# Patient Record
Sex: FEMALE | Race: BLACK OR AFRICAN AMERICAN | NOT HISPANIC OR LATINO | Employment: FULL TIME | URBAN - NONMETROPOLITAN AREA
[De-identification: names, ages, dates, MRNs, and addresses within clinical notes are randomized per-mention and may not be internally consistent; named-entity substitution may affect disease eponyms.]

---

## 2021-05-13 ENCOUNTER — TELEPHONE (OUTPATIENT)
Dept: FAMILY MEDICINE | Facility: CLINIC | Age: 31
End: 2021-05-13

## 2021-05-13 ENCOUNTER — OFFICE VISIT (OUTPATIENT)
Dept: FAMILY MEDICINE | Facility: CLINIC | Age: 31
End: 2021-05-13
Payer: COMMERCIAL

## 2021-05-13 VITALS
WEIGHT: 221 LBS | RESPIRATION RATE: 18 BRPM | HEIGHT: 66 IN | HEART RATE: 95 BPM | BODY MASS INDEX: 35.52 KG/M2 | TEMPERATURE: 99 F | OXYGEN SATURATION: 99 % | SYSTOLIC BLOOD PRESSURE: 110 MMHG | DIASTOLIC BLOOD PRESSURE: 74 MMHG

## 2021-05-13 DIAGNOSIS — M54.9 DORSALGIA, UNSPECIFIED: ICD-10-CM

## 2021-05-13 DIAGNOSIS — Z13.220 SCREENING FOR LIPOID DISORDERS: Primary | ICD-10-CM

## 2021-05-13 DIAGNOSIS — R53.83 FATIGUE, UNSPECIFIED TYPE: ICD-10-CM

## 2021-05-13 DIAGNOSIS — E66.9 OBESITY (BMI 35.0-39.9 WITHOUT COMORBIDITY): ICD-10-CM

## 2021-05-13 DIAGNOSIS — Z13.1 SCREENING FOR DIABETES MELLITUS: ICD-10-CM

## 2021-05-13 PROCEDURE — 99395 PREV VISIT EST AGE 18-39: CPT | Mod: ,,, | Performed by: NURSE PRACTITIONER

## 2021-05-13 PROCEDURE — 3008F BODY MASS INDEX DOCD: CPT | Mod: ,,, | Performed by: NURSE PRACTITIONER

## 2021-05-13 PROCEDURE — 1126F AMNT PAIN NOTED NONE PRSNT: CPT | Mod: ,,, | Performed by: NURSE PRACTITIONER

## 2021-05-13 PROCEDURE — 1126F PR PAIN SEVERITY QUANTIFIED, NO PAIN PRESENT: ICD-10-PCS | Mod: ,,, | Performed by: NURSE PRACTITIONER

## 2021-05-13 PROCEDURE — 3008F PR BODY MASS INDEX (BMI) DOCUMENTED: ICD-10-PCS | Mod: ,,, | Performed by: NURSE PRACTITIONER

## 2021-05-13 PROCEDURE — 99395 PR PREVENTIVE VISIT,EST,18-39: ICD-10-PCS | Mod: ,,, | Performed by: NURSE PRACTITIONER

## 2021-05-18 DIAGNOSIS — R53.83 FATIGUE, UNSPECIFIED TYPE: Primary | ICD-10-CM

## 2021-05-18 DIAGNOSIS — Z13.220 SCREENING FOR LIPOID DISORDERS: ICD-10-CM

## 2021-05-18 DIAGNOSIS — Z13.1 SCREENING FOR DIABETES MELLITUS: ICD-10-CM

## 2021-11-16 ENCOUNTER — OFFICE VISIT (OUTPATIENT)
Dept: FAMILY MEDICINE | Facility: CLINIC | Age: 31
End: 2021-11-16
Payer: COMMERCIAL

## 2021-11-16 VITALS
OXYGEN SATURATION: 99 % | HEART RATE: 98 BPM | BODY MASS INDEX: 35.52 KG/M2 | DIASTOLIC BLOOD PRESSURE: 73 MMHG | RESPIRATION RATE: 18 BRPM | HEIGHT: 66 IN | SYSTOLIC BLOOD PRESSURE: 116 MMHG | TEMPERATURE: 98 F | WEIGHT: 221 LBS

## 2021-11-16 DIAGNOSIS — F98.8 ATTENTION DEFICIT DISORDER, UNSPECIFIED HYPERACTIVITY PRESENCE: ICD-10-CM

## 2021-11-16 DIAGNOSIS — M54.2 NECK PAIN: Primary | ICD-10-CM

## 2021-11-16 PROCEDURE — 1159F MED LIST DOCD IN RCRD: CPT | Mod: ,,, | Performed by: FAMILY MEDICINE

## 2021-11-16 PROCEDURE — 99213 OFFICE O/P EST LOW 20 MIN: CPT | Mod: ,,, | Performed by: FAMILY MEDICINE

## 2021-11-16 PROCEDURE — 1159F PR MEDICATION LIST DOCUMENTED IN MEDICAL RECORD: ICD-10-PCS | Mod: ,,, | Performed by: FAMILY MEDICINE

## 2021-11-16 PROCEDURE — 3008F BODY MASS INDEX DOCD: CPT | Mod: ,,, | Performed by: FAMILY MEDICINE

## 2021-11-16 PROCEDURE — 3078F PR MOST RECENT DIASTOLIC BLOOD PRESSURE < 80 MM HG: ICD-10-PCS | Mod: ,,, | Performed by: FAMILY MEDICINE

## 2021-11-16 PROCEDURE — 3078F DIAST BP <80 MM HG: CPT | Mod: ,,, | Performed by: FAMILY MEDICINE

## 2021-11-16 PROCEDURE — 3074F PR MOST RECENT SYSTOLIC BLOOD PRESSURE < 130 MM HG: ICD-10-PCS | Mod: ,,, | Performed by: FAMILY MEDICINE

## 2021-11-16 PROCEDURE — 99213 PR OFFICE/OUTPT VISIT, EST, LEVL III, 20-29 MIN: ICD-10-PCS | Mod: ,,, | Performed by: FAMILY MEDICINE

## 2021-11-16 PROCEDURE — 3074F SYST BP LT 130 MM HG: CPT | Mod: ,,, | Performed by: FAMILY MEDICINE

## 2021-11-16 PROCEDURE — 3008F PR BODY MASS INDEX (BMI) DOCUMENTED: ICD-10-PCS | Mod: ,,, | Performed by: FAMILY MEDICINE

## 2021-11-16 RX ORDER — ATOMOXETINE 25 MG/1
CAPSULE ORAL
Qty: 30 CAPSULE | Refills: 0 | Status: SHIPPED | OUTPATIENT
Start: 2021-11-16 | End: 2021-12-28 | Stop reason: DRUGHIGH

## 2021-11-16 RX ORDER — TIZANIDINE 4 MG/1
4 TABLET ORAL NIGHTLY PRN
Qty: 90 TABLET | Refills: 1 | Status: SHIPPED | OUTPATIENT
Start: 2021-11-16 | End: 2021-11-26

## 2021-11-17 ENCOUNTER — TELEPHONE (OUTPATIENT)
Dept: FAMILY MEDICINE | Facility: CLINIC | Age: 31
End: 2021-11-17
Payer: COMMERCIAL

## 2021-12-28 ENCOUNTER — OFFICE VISIT (OUTPATIENT)
Dept: FAMILY MEDICINE | Facility: CLINIC | Age: 31
End: 2021-12-28
Payer: COMMERCIAL

## 2021-12-28 VITALS
BODY MASS INDEX: 36.16 KG/M2 | OXYGEN SATURATION: 97 % | TEMPERATURE: 99 F | DIASTOLIC BLOOD PRESSURE: 90 MMHG | RESPIRATION RATE: 18 BRPM | SYSTOLIC BLOOD PRESSURE: 114 MMHG | WEIGHT: 225 LBS | HEART RATE: 76 BPM | HEIGHT: 66 IN

## 2021-12-28 DIAGNOSIS — Z00.00 ROUTINE GENERAL MEDICAL EXAMINATION AT A HEALTH CARE FACILITY: Primary | ICD-10-CM

## 2021-12-28 DIAGNOSIS — Z13.220 ENCOUNTER FOR SCREENING FOR LIPOID DISORDERS: ICD-10-CM

## 2021-12-28 DIAGNOSIS — Z12.4 SCREENING FOR CERVICAL CANCER: ICD-10-CM

## 2021-12-28 DIAGNOSIS — M25.50 ARTHRALGIA, UNSPECIFIED JOINT: ICD-10-CM

## 2021-12-28 DIAGNOSIS — Z13.1 SCREENING FOR DIABETES MELLITUS: ICD-10-CM

## 2021-12-28 DIAGNOSIS — F90.0 ATTENTION DEFICIT HYPERACTIVITY DISORDER (ADHD), PREDOMINANTLY INATTENTIVE TYPE: ICD-10-CM

## 2021-12-28 LAB
CHOLEST SERPL-MCNC: 129 MG/DL (ref 0–200)
CHOLEST/HDLC SERPL: 2.4 {RATIO}
GLUCOSE SERPL-MCNC: 63 MG/DL (ref 74–106)
HDLC SERPL-MCNC: 53 MG/DL (ref 40–60)
LDLC SERPL CALC-MCNC: 68 MG/DL
LDLC/HDLC SERPL: 1.3 {RATIO}
NONHDLC SERPL-MCNC: 76 MG/DL
TRIGL SERPL-MCNC: 42 MG/DL (ref 35–150)
VLDLC SERPL-MCNC: 8 MG/DL

## 2021-12-28 PROCEDURE — 1160F RVW MEDS BY RX/DR IN RCRD: CPT | Mod: ,,, | Performed by: FAMILY MEDICINE

## 2021-12-28 PROCEDURE — 1159F PR MEDICATION LIST DOCUMENTED IN MEDICAL RECORD: ICD-10-PCS | Mod: ,,, | Performed by: FAMILY MEDICINE

## 2021-12-28 PROCEDURE — 1159F MED LIST DOCD IN RCRD: CPT | Mod: ,,, | Performed by: FAMILY MEDICINE

## 2021-12-28 PROCEDURE — 1160F PR REVIEW ALL MEDS BY PRESCRIBER/CLIN PHARMACIST DOCUMENTED: ICD-10-PCS | Mod: ,,, | Performed by: FAMILY MEDICINE

## 2021-12-28 PROCEDURE — 3008F PR BODY MASS INDEX (BMI) DOCUMENTED: ICD-10-PCS | Mod: ,,, | Performed by: FAMILY MEDICINE

## 2021-12-28 PROCEDURE — 3074F SYST BP LT 130 MM HG: CPT | Mod: ,,, | Performed by: FAMILY MEDICINE

## 2021-12-28 PROCEDURE — 80061 LIPID PANEL: ICD-10-PCS | Mod: ,,, | Performed by: CLINICAL MEDICAL LABORATORY

## 2021-12-28 PROCEDURE — 3074F PR MOST RECENT SYSTOLIC BLOOD PRESSURE < 130 MM HG: ICD-10-PCS | Mod: ,,, | Performed by: FAMILY MEDICINE

## 2021-12-28 PROCEDURE — 3080F PR MOST RECENT DIASTOLIC BLOOD PRESSURE >= 90 MM HG: ICD-10-PCS | Mod: ,,, | Performed by: FAMILY MEDICINE

## 2021-12-28 PROCEDURE — 82947 GLUCOSE, FASTING: ICD-10-PCS | Mod: ,,, | Performed by: CLINICAL MEDICAL LABORATORY

## 2021-12-28 PROCEDURE — 80061 LIPID PANEL: CPT | Mod: ,,, | Performed by: CLINICAL MEDICAL LABORATORY

## 2021-12-28 PROCEDURE — 3008F BODY MASS INDEX DOCD: CPT | Mod: ,,, | Performed by: FAMILY MEDICINE

## 2021-12-28 PROCEDURE — 82947 ASSAY GLUCOSE BLOOD QUANT: CPT | Mod: ,,, | Performed by: CLINICAL MEDICAL LABORATORY

## 2021-12-28 PROCEDURE — 99395 PREV VISIT EST AGE 18-39: CPT | Mod: ,,, | Performed by: FAMILY MEDICINE

## 2021-12-28 PROCEDURE — 3080F DIAST BP >= 90 MM HG: CPT | Mod: ,,, | Performed by: FAMILY MEDICINE

## 2021-12-28 PROCEDURE — 99395 PR PREVENTIVE VISIT,EST,18-39: ICD-10-PCS | Mod: ,,, | Performed by: FAMILY MEDICINE

## 2021-12-28 RX ORDER — CYCLOBENZAPRINE HCL 10 MG
TABLET ORAL
COMMUNITY
Start: 2021-08-12 | End: 2023-01-12 | Stop reason: SDUPTHER

## 2021-12-28 RX ORDER — NAPROXEN 500 MG/1
TABLET ORAL
Qty: 60 TABLET | Refills: 2 | Status: SHIPPED | OUTPATIENT
Start: 2021-12-28 | End: 2023-01-12 | Stop reason: SDUPTHER

## 2021-12-28 RX ORDER — NAPROXEN 500 MG/1
TABLET ORAL
COMMUNITY
Start: 2021-08-12 | End: 2021-12-28 | Stop reason: SDUPTHER

## 2021-12-28 RX ORDER — ATOMOXETINE 60 MG/1
60 CAPSULE ORAL DAILY
Qty: 30 CAPSULE | Refills: 2 | Status: SHIPPED | OUTPATIENT
Start: 2021-12-28 | End: 2023-01-12

## 2021-12-29 ENCOUNTER — TELEPHONE (OUTPATIENT)
Dept: FAMILY MEDICINE | Facility: CLINIC | Age: 31
End: 2021-12-29
Payer: COMMERCIAL

## 2022-01-05 ENCOUNTER — TELEPHONE (OUTPATIENT)
Dept: FAMILY MEDICINE | Facility: CLINIC | Age: 32
End: 2022-01-05
Payer: COMMERCIAL

## 2022-01-05 NOTE — TELEPHONE ENCOUNTER
----- Message from Melany Rob sent at 12/30/2021  9:29 AM CST -----  Regarding: Lab Results  Contact: Patient  Pt needs: Lab Results Call Back    Pharmacy:    Phone #:  [phone]    Patient missed a call yesterday and wanted a call back about her results.

## 2022-09-20 ENCOUNTER — OFFICE VISIT (OUTPATIENT)
Dept: FAMILY MEDICINE | Facility: CLINIC | Age: 32
End: 2022-09-20
Payer: COMMERCIAL

## 2022-09-20 VITALS
HEIGHT: 66 IN | TEMPERATURE: 97 F | RESPIRATION RATE: 18 BRPM | DIASTOLIC BLOOD PRESSURE: 78 MMHG | OXYGEN SATURATION: 98 % | HEART RATE: 78 BPM | BODY MASS INDEX: 36.16 KG/M2 | SYSTOLIC BLOOD PRESSURE: 138 MMHG | WEIGHT: 225 LBS

## 2022-09-20 DIAGNOSIS — R00.2 PALPITATIONS: ICD-10-CM

## 2022-09-20 DIAGNOSIS — L73.2 HIDRADENITIS: Primary | ICD-10-CM

## 2022-09-20 LAB
EKG 12-LEAD: NORMAL
PR INTERVAL: NORMAL
PRT AXES: NORMAL
QRS DURATION: NORMAL
QT/QTC: NORMAL
VENTRICULAR RATE: NORMAL

## 2022-09-20 PROCEDURE — 99213 PR OFFICE/OUTPT VISIT, EST, LEVL III, 20-29 MIN: ICD-10-PCS | Mod: ,,, | Performed by: FAMILY MEDICINE

## 2022-09-20 PROCEDURE — 3078F DIAST BP <80 MM HG: CPT | Mod: ,,, | Performed by: FAMILY MEDICINE

## 2022-09-20 PROCEDURE — 3008F PR BODY MASS INDEX (BMI) DOCUMENTED: ICD-10-PCS | Mod: ,,, | Performed by: FAMILY MEDICINE

## 2022-09-20 PROCEDURE — 3008F BODY MASS INDEX DOCD: CPT | Mod: ,,, | Performed by: FAMILY MEDICINE

## 2022-09-20 PROCEDURE — 99213 OFFICE O/P EST LOW 20 MIN: CPT | Mod: ,,, | Performed by: FAMILY MEDICINE

## 2022-09-20 PROCEDURE — 1159F MED LIST DOCD IN RCRD: CPT | Mod: ,,, | Performed by: FAMILY MEDICINE

## 2022-09-20 PROCEDURE — 1160F PR REVIEW ALL MEDS BY PRESCRIBER/CLIN PHARMACIST DOCUMENTED: ICD-10-PCS | Mod: ,,, | Performed by: FAMILY MEDICINE

## 2022-09-20 PROCEDURE — 93005 POCT EKG 12-LEAD: ICD-10-PCS | Mod: ,,, | Performed by: FAMILY MEDICINE

## 2022-09-20 PROCEDURE — 1159F PR MEDICATION LIST DOCUMENTED IN MEDICAL RECORD: ICD-10-PCS | Mod: ,,, | Performed by: FAMILY MEDICINE

## 2022-09-20 PROCEDURE — 93005 ELECTROCARDIOGRAM TRACING: CPT | Mod: ,,, | Performed by: FAMILY MEDICINE

## 2022-09-20 PROCEDURE — 3075F PR MOST RECENT SYSTOLIC BLOOD PRESS GE 130-139MM HG: ICD-10-PCS | Mod: ,,, | Performed by: FAMILY MEDICINE

## 2022-09-20 PROCEDURE — 3078F PR MOST RECENT DIASTOLIC BLOOD PRESSURE < 80 MM HG: ICD-10-PCS | Mod: ,,, | Performed by: FAMILY MEDICINE

## 2022-09-20 PROCEDURE — 3075F SYST BP GE 130 - 139MM HG: CPT | Mod: ,,, | Performed by: FAMILY MEDICINE

## 2022-09-20 PROCEDURE — 1160F RVW MEDS BY RX/DR IN RCRD: CPT | Mod: ,,, | Performed by: FAMILY MEDICINE

## 2022-09-20 NOTE — PROGRESS NOTES
Tiffany Cornejo is a 31 y.o. female seen today for a recurrent papular eruption in her left axilla.  Patient reports often these with swelling drain and so far have not responded are improved when she has been on antibiotics.  We discussed a dermatology verses general surgery option.  For now, she would like something definitive done a possible.  She is also complaining of episodes of tachycardia that usually worsen with exertion.  Currently she is not having chest discomfort.    Past Medical History:   Diagnosis Date    ADHD (attention deficit hyperactivity disorder)      Family History   Problem Relation Age of Onset    Diabetes Mother     Hypertension Mother     Irritable bowel syndrome Mother     Cancer Maternal Grandfather     Hypertension Maternal Grandfather     Diabetes Maternal Grandfather      Current Outpatient Medications on File Prior to Visit   Medication Sig Dispense Refill    atomoxetine (STRATTERA) 60 MG capsule Take 1 capsule (60 mg total) by mouth once daily. 30 capsule 2    cyclobenzaprine (FLEXERIL) 10 MG tablet TAKE 1 TABLET BY MOUTH AT BEDTIME WITH FOOD CAUSES DROWSINESS-AVOID ALCOHOL!!      naproxen (NAPROSYN) 500 MG tablet TAKE 1 TABLET BY MOUTH TWICE A DAY WITH FOOD ...STOP TAKING ALL OTHER NSAIDS WHILE TAKING THIS MEDICATION 60 tablet 2     No current facility-administered medications on file prior to visit.       There is no immunization history on file for this patient.    Review of Systems   HENT:  Negative for hearing loss.    Eyes:  Negative for discharge.   Respiratory:  Negative for wheezing.    Cardiovascular:  Positive for palpitations. Negative for chest pain.   Gastrointestinal:  Negative for blood in stool, constipation, diarrhea and vomiting.   Genitourinary:  Negative for dysuria and hematuria.   Musculoskeletal:  Positive for neck pain.   Skin:  Positive for rash.   Neurological:  Negative for weakness and headaches.   Endo/Heme/Allergies:  Negative for polydipsia.       Vitals:    09/20/22 1350   BP: 138/78   Pulse: 78   Resp: 18   Temp: 97.4 °F (36.3 °C)       Physical Exam  Vitals reviewed.   Constitutional:       Appearance: Normal appearance.   HENT:      Head: Normocephalic.   Eyes:      Extraocular Movements: Extraocular movements intact.      Conjunctiva/sclera: Conjunctivae normal.      Pupils: Pupils are equal, round, and reactive to light.   Neck:      Thyroid: No thyroid mass or thyromegaly.   Cardiovascular:      Rate and Rhythm: Normal rate and regular rhythm.      Heart sounds: Normal heart sounds. No murmur heard.    No gallop.   Pulmonary:      Effort: Pulmonary effort is normal. No respiratory distress.      Breath sounds: Normal breath sounds. No wheezing or rales.   Skin:     General: Skin is warm and dry.      Coloration: Skin is not jaundiced or pale.      Findings: Rash present. Rash is nodular and papular.      Comments: Patient has subcutaneous nodular rash affecting her left axilla.   Neurological:      Mental Status: She is alert.   Psychiatric:         Mood and Affect: Mood normal.         Behavior: Behavior normal.         Thought Content: Thought content normal.         Judgment: Judgment normal.        Assessment and Plan  Hidradenitis  -     Ambulatory referral/consult to General Surgery; Future; Expected date: 09/27/2022    Palpitations  -     POCT EKG 12-LEAD (NOT FOR [facility] USE)  -     Comprehensive Metabolic Panel; Future; Expected date: 09/20/2022  -     Magnesium; Future; Expected date: 09/20/2022  -     TSH; Future; Expected date: 09/20/2022  -     Ambulatory referral/consult to Cardiology; Future; Expected date: 09/27/2022          Return to clinic in 2-3 weeks or as needed.  She will be seen by Cardiology tomorrow.    The patient has no Health Maintenance topics of status Not Due      Answers submitted by the patient for this visit:  Review of Systems Questionnaire (Submitted on 9/17/2022)  activity change: No  unexpected weight change:  No  rhinorrhea: No  trouble swallowing: No  visual disturbance: No  chest tightness: No  polyuria: No  difficulty urinating: No  menstrual problem: No  joint swelling: Yes  arthralgias: Yes  confusion: No  dysphoric mood: No

## 2022-09-21 DIAGNOSIS — R00.2 PALPITATIONS: Primary | ICD-10-CM

## 2022-09-26 ENCOUNTER — TELEPHONE (OUTPATIENT)
Dept: FAMILY MEDICINE | Facility: CLINIC | Age: 32
End: 2022-09-26
Payer: COMMERCIAL

## 2022-09-27 ENCOUNTER — OFFICE VISIT (OUTPATIENT)
Dept: SURGERY | Facility: CLINIC | Age: 32
End: 2022-09-27
Attending: SURGERY
Payer: COMMERCIAL

## 2022-09-27 DIAGNOSIS — L73.2 HIDRADENITIS: Primary | ICD-10-CM

## 2022-09-27 DIAGNOSIS — Z01.812 PRE-PROCEDURAL LABORATORY EXAMINATION: ICD-10-CM

## 2022-09-27 PROCEDURE — 99203 PR OFFICE/OUTPT VISIT, NEW, LEVL III, 30-44 MIN: ICD-10-PCS | Mod: S$PBB,,, | Performed by: SURGERY

## 2022-09-27 PROCEDURE — 99214 OFFICE O/P EST MOD 30 MIN: CPT | Mod: PBBFAC | Performed by: SURGERY

## 2022-09-27 PROCEDURE — 1159F MED LIST DOCD IN RCRD: CPT | Mod: ,,, | Performed by: SURGERY

## 2022-09-27 PROCEDURE — 99203 OFFICE O/P NEW LOW 30 MIN: CPT | Mod: S$PBB,,, | Performed by: SURGERY

## 2022-09-27 PROCEDURE — 1159F PR MEDICATION LIST DOCUMENTED IN MEDICAL RECORD: ICD-10-PCS | Mod: ,,, | Performed by: SURGERY

## 2022-09-27 NOTE — PATIENT INSTRUCTIONS
[facility]  [address]  [place] [address]      YOUR SURGERY DATE IS 10/17/22    LABWORK AND EKG IS SCHEDULED FOR 10/13/22. PLEASE SIGN IN ON 1ST FLOOR OF THE  [facility] FOR LAB.  EKG IS LOCATED ON 2ND FLOOR.      DO NOT EAT OR DRINK ANYTHING AFTER MIDNIGHT BEFORE YOU SURGERY    BRING ALL MEDICATIONS YOU ARE CURRENTLY TAKING WITH YOU.  IF YOU ARE TAKING  A BLOOD PRESSURE MEDICATION, TAKE YOUR BLOOD PRESSURE MEDICATION WITH A   SIP OF WATER WHEN YOU GET UP THE MORNING OF SURGERY.     YOU MUST PRE-ADMIT AT THE FOLLOWING WEBSITE:  WEBSITE: [email or web address]  PASSWORD: [email or web address]    A STAFF MEMBER FROM THE [facility] WILL CALL YOU THE DAY BEFORE  SURGERY TO LET YOU KNOW WHAT TIME TO ARRIVE THE MORNING OF SURGERY.  IF YOU HAVE NOT HEARD FROM THEM BY 4 P.M. THE DAY BEFORE YOUR SURGERY,   PLEASE CALL THEM [phone].      IF YOU HAVE ANY QUESTIONS YOU MAY CONTACT OUR OFFICE [phone].

## 2022-09-28 NOTE — PROGRESS NOTES
Subjective:       Patient ID: Tiffany Cornejo is a 31 y.o. female.    Chief Complaint: Hidradenitis Suppurativa (Left axilla)    30 y/o female with history of pain in bilateral axilla and intermittent drainage.  She has not had prior surgery.  She has been diagnosed with hidradenitis and is sent for surgical evaluation.      Hidradenitis Suppurativa    family history includes Cancer in her maternal grandfather; Diabetes in her maternal grandfather and mother; Hypertension in her maternal grandfather and mother; Irritable bowel syndrome in her mother.  Past Medical History:   Diagnosis Date    ADHD (attention deficit hyperactivity disorder)       Past Surgical History:   Procedure Laterality Date     SECTION      GALLBLADDER SURGERY      HIP SURGERY      KNEE SURGERY         reports that she has been smoking vaping with nicotine. She has never used smokeless tobacco. She reports current alcohol use. She reports that she does not use drugs.     Review of Systems   All other systems reviewed and are negative.       Objective:      There were no vitals taken for this visit.   Physical Exam  Constitutional:       General: She is awake.      Appearance: Normal appearance.   HENT:      Head: Atraumatic.   Cardiovascular:      Rate and Rhythm: Normal rate and regular rhythm.   Pulmonary:      Effort: Pulmonary effort is normal.   Chest:      Chest wall: No deformity.   Abdominal:      General: There is no distension.      Palpations: Abdomen is soft. There is no mass.      Tenderness: There is no abdominal tenderness.   Musculoskeletal:         General: No swelling.      Right lower leg: No edema.      Left lower leg: No edema.   Skin:     General: Skin is warm and dry.      Capillary Refill: Capillary refill takes less than 2 seconds.      Comments: Left axilla with three areas of swelling and mild tenderness in the subcuticular position.  No drainage.    Neurological:      General: No focal deficit present.       Mental Status: She is alert.   Psychiatric:         Mood and Affect: Mood normal.       Assessment/Plan:         Hidradenitis  -     CBC Auto Differential; Future; Expected date: 09/27/2022  -     Cancel: Ambulatory Referral to External Surgery  -     Ambulatory referral/consult to General Surgery  -     Cancel: SARS Antigen ([facility]); Future; Expected date: 10/13/2022  -     Ambulatory Referral to External Surgery    Pre-procedural laboratory examination  -     Cancel: SARS Antigen ([facility]); Future; Expected date: 10/13/2022         Problem List Items Addressed This Visit          Derm    Hidradenitis - Primary    Current Assessment & Plan     Plan excision left axilla hidradenitis  R/B include infection, poor healing, recurrence.  She understands, wishes to proceed.          Relevant Orders    CBC Auto Differential (Completed)    Ambulatory Referral to External Surgery     Other Visit Diagnoses       Pre-procedural laboratory examination

## 2022-09-28 NOTE — ASSESSMENT & PLAN NOTE
Plan excision left axilla hidradenitis  R/B include infection, poor healing, recurrence.  She understands, wishes to proceed.

## 2022-10-03 ENCOUNTER — OUTSIDE PLACE OF SERVICE (OUTPATIENT)
Dept: SURGERY | Facility: CLINIC | Age: 32
End: 2022-10-03
Payer: COMMERCIAL

## 2022-10-03 ENCOUNTER — LAB REQUISITION (OUTPATIENT)
Dept: LAB | Facility: HOSPITAL | Age: 32
End: 2022-10-03
Attending: SURGERY
Payer: COMMERCIAL

## 2022-10-03 DIAGNOSIS — L73.2 HIDRADENITIS SUPPURATIVA: ICD-10-CM

## 2022-10-03 PROCEDURE — 88304 TISSUE EXAM BY PATHOLOGIST: CPT | Mod: SUR | Performed by: SURGERY

## 2022-10-03 PROCEDURE — 11450 PR EXC SWEAT GLAND LESN AXILL,SIMPL: ICD-10-PCS | Mod: LT,,, | Performed by: SURGERY

## 2022-10-03 PROCEDURE — 88304 TISSUE EXAM BY PATHOLOGIST: CPT | Mod: 26,,, | Performed by: PATHOLOGY

## 2022-10-03 PROCEDURE — 11450 EXC SKN HDRDNT AX SMPL/NTRM: CPT | Mod: LT,,, | Performed by: SURGERY

## 2022-10-03 PROCEDURE — 88304 SURGICAL PATHOLOGY: ICD-10-PCS | Mod: 26,,, | Performed by: PATHOLOGY

## 2022-10-04 LAB
ESTROGEN SERPL-MCNC: NORMAL PG/ML
INSULIN SERPL-ACNC: NORMAL U[IU]/ML
LAB AP GROSS DESCRIPTION: NORMAL
LAB AP LABORATORY NOTES: NORMAL
T3RU NFR SERPL: NORMAL %

## 2022-10-14 ENCOUNTER — OFFICE VISIT (OUTPATIENT)
Dept: SURGERY | Facility: CLINIC | Age: 32
End: 2022-10-14
Attending: SURGERY
Payer: COMMERCIAL

## 2022-10-14 DIAGNOSIS — Z09 POSTOP CHECK: Primary | ICD-10-CM

## 2022-10-14 PROCEDURE — 99212 OFFICE O/P EST SF 10 MIN: CPT | Mod: S$PBB,,, | Performed by: NURSE PRACTITIONER

## 2022-10-14 PROCEDURE — 99212 OFFICE O/P EST SF 10 MIN: CPT | Mod: PBBFAC | Performed by: NURSE PRACTITIONER

## 2022-10-14 PROCEDURE — 1160F PR REVIEW ALL MEDS BY PRESCRIBER/CLIN PHARMACIST DOCUMENTED: ICD-10-PCS | Mod: ,,, | Performed by: NURSE PRACTITIONER

## 2022-10-14 PROCEDURE — 1159F MED LIST DOCD IN RCRD: CPT | Mod: ,,, | Performed by: NURSE PRACTITIONER

## 2022-10-14 PROCEDURE — 1160F RVW MEDS BY RX/DR IN RCRD: CPT | Mod: ,,, | Performed by: NURSE PRACTITIONER

## 2022-10-14 PROCEDURE — 99212 PR OFFICE/OUTPT VISIT, EST, LEVL II, 10-19 MIN: ICD-10-PCS | Mod: S$PBB,,, | Performed by: NURSE PRACTITIONER

## 2022-10-14 PROCEDURE — 1159F PR MEDICATION LIST DOCUMENTED IN MEDICAL RECORD: ICD-10-PCS | Mod: ,,, | Performed by: NURSE PRACTITIONER

## 2022-10-14 NOTE — PROGRESS NOTES
Tiffany Cornejo is a 31 y.o. female patient.   No diagnosis found.  Past Medical History:   Diagnosis Date    ADHD (attention deficit hyperactivity disorder)      No past surgical history pertinent negatives on file.  Scheduled Meds:  Continuous Infusions:  PRN Meds:    Review of patient's allergies indicates:  No Known Allergies  There are no hospital problems to display for this patient.    There were no vitals taken for this visit.    Subjective anxious re: having sutures removed  Objective  left axilla sutures well approximated no signs of infection   Assessment & Plan     A. Skin and subcutaneous tissue of the left axilla, excisional debridement:  Chronic inflammatory changes and abscess associated with hidradenitis     B. Additional skin and subcutaneous tissue of the left axilla, excisional debridement:  Chronic inflammatory changes associated with disrupted epidermal inclusion cyst and hidradenitis        2 week follow up excision hidradenitis    Remove sutures  Educated on path  Educated keep clean/dry  Return to work 1 week if no problems    BRUNO Cotton  10/14/2022

## 2022-10-14 NOTE — LETTER
October 14, 2022      [facility] - General Surgery  [address]  [place] [address]  Phone: [phone]  Fax: [phone]       Patient: Tiffany Cornejo   YOB: 1990  Date of Visit: 10/14/2022    To Whom It May Concern:    Delmer Cornejo  was at [facility] on 10/14/2022. The patient may return to work on 10/24/22 with no restrictions. If you have any questions or concerns, or if I can be of further assistance, please do not hesitate to contact me.    Sincerely,    Darío Chiu M.D.

## 2022-12-30 ENCOUNTER — HOSPITAL ENCOUNTER (EMERGENCY)
Facility: HOSPITAL | Age: 32
Discharge: HOME | End: 2022-12-30
Attending: EMERGENCY MEDICINE
Payer: OTHER MISCELLANEOUS

## 2022-12-30 VITALS
HEIGHT: 66 IN | BODY MASS INDEX: 36.16 KG/M2 | WEIGHT: 225 LBS | SYSTOLIC BLOOD PRESSURE: 128 MMHG | RESPIRATION RATE: 16 BRPM | DIASTOLIC BLOOD PRESSURE: 78 MMHG | OXYGEN SATURATION: 88 % | HEART RATE: 89 BPM | TEMPERATURE: 98 F

## 2022-12-30 DIAGNOSIS — S89.91XA KNEE INJURY, RIGHT, INITIAL ENCOUNTER: ICD-10-CM

## 2022-12-30 DIAGNOSIS — T14.90XA INJURY: ICD-10-CM

## 2022-12-30 DIAGNOSIS — S80.01XA CONTUSION OF RIGHT KNEE, INITIAL ENCOUNTER: Primary | ICD-10-CM

## 2022-12-30 PROCEDURE — 99283 EMERGENCY DEPT VISIT LOW MDM: CPT | Mod: ,,, | Performed by: EMERGENCY MEDICINE

## 2022-12-30 PROCEDURE — 99283 PR EMERGENCY DEPT VISIT,LEVEL III: ICD-10-PCS | Mod: ,,, | Performed by: EMERGENCY MEDICINE

## 2022-12-30 PROCEDURE — 99284 EMERGENCY DEPT VISIT MOD MDM: CPT

## 2022-12-30 PROCEDURE — 63600175 PHARM REV CODE 636 W HCPCS: Performed by: EMERGENCY MEDICINE

## 2022-12-30 PROCEDURE — 25000003 PHARM REV CODE 250: Performed by: EMERGENCY MEDICINE

## 2022-12-30 PROCEDURE — 96372 THER/PROPH/DIAG INJ SC/IM: CPT | Performed by: EMERGENCY MEDICINE

## 2022-12-30 RX ORDER — KETOROLAC TROMETHAMINE 30 MG/ML
60 INJECTION, SOLUTION INTRAMUSCULAR; INTRAVENOUS
Status: COMPLETED | OUTPATIENT
Start: 2022-12-30 | End: 2022-12-30

## 2022-12-30 RX ORDER — METHOCARBAMOL 500 MG/1
TABLET, FILM COATED ORAL 3 TIMES DAILY
Qty: 30 TABLET | Refills: 0 | Status: SHIPPED | OUTPATIENT
Start: 2022-12-30 | End: 2022-12-30 | Stop reason: SDUPTHER

## 2022-12-30 RX ORDER — ONDANSETRON 4 MG/1
4 TABLET, ORALLY DISINTEGRATING ORAL
Status: COMPLETED | OUTPATIENT
Start: 2022-12-30 | End: 2022-12-30

## 2022-12-30 RX ORDER — METHOCARBAMOL 500 MG/1
TABLET, FILM COATED ORAL 3 TIMES DAILY
Qty: 30 TABLET | Refills: 0 | Status: SHIPPED | OUTPATIENT
Start: 2022-12-30 | End: 2023-01-04

## 2022-12-30 RX ORDER — ORPHENADRINE CITRATE 30 MG/ML
60 INJECTION INTRAMUSCULAR; INTRAVENOUS
Status: COMPLETED | OUTPATIENT
Start: 2022-12-30 | End: 2022-12-30

## 2022-12-30 RX ADMIN — KETOROLAC TROMETHAMINE 60 MG: 30 INJECTION, SOLUTION INTRAMUSCULAR at 07:12

## 2022-12-30 RX ADMIN — ONDANSETRON 4 MG: 4 TABLET, ORALLY DISINTEGRATING ORAL at 07:12

## 2022-12-30 RX ADMIN — ORPHENADRINE CITRATE 60 MG: 30 INJECTION INTRAMUSCULAR; INTRAVENOUS at 07:12

## 2022-12-30 NOTE — Clinical Note
"Tiffany Narayan" Clemente was seen and treated in our emergency department on 12/30/2022.  She may return to work after being cleared by follow-up physician 01/06/2023.       If you have any questions or concerns, please don't hesitate to call.       RN"

## 2022-12-31 NOTE — ED PROVIDER NOTES
Encounter Date: 2022    SCRIBE #1 NOTE: I, Osiris Farr, am scribing for, and in the presence of,  Serafin Nunez MD. I have scribed the entire note.     History     Chief Complaint   Patient presents with    Knee Injury     Right knee- ambulance stretcher came down on top of knee and patient felt knee cap displace. Patient unsure if knee cap is back in place. Patient had episode of vomiting after incident     32 y.o. female presents to the ED with complaints of right knee pain. Patient was here unloading a patient when the stretcher bounced off her right knee.     The history is provided by the patient. No  was used.   Review of patient's allergies indicates:  No Known Allergies  Past Medical History:   Diagnosis Date    ADHD (attention deficit hyperactivity disorder)      Past Surgical History:   Procedure Laterality Date     SECTION      EXCISION OF HIDRADENITIS      GALLBLADDER SURGERY      HIP SURGERY      KNEE SURGERY       Family History   Problem Relation Age of Onset    Diabetes Mother     Hypertension Mother     Irritable bowel syndrome Mother     Cancer Maternal Grandfather     Hypertension Maternal Grandfather     Diabetes Maternal Grandfather      Social History     Tobacco Use    Smoking status: Every Day     Types: Vaping with nicotine    Smokeless tobacco: Never   Substance Use Topics    Alcohol use: Yes     Comment: Socially    Drug use: Never     Review of Systems   Constitutional: Negative.    HENT: Negative.     Eyes: Negative.    Respiratory: Negative.     Cardiovascular: Negative.    Gastrointestinal: Negative.    Endocrine: Negative.    Genitourinary: Negative.    Musculoskeletal:         Right knee pain.    Skin: Negative.    Allergic/Immunologic: Negative.    Neurological: Negative.    Hematological: Negative.    Psychiatric/Behavioral: Negative.     All other systems reviewed and are negative.    Physical Exam     Initial Vitals [22 1838]    BP Pulse Resp Temp SpO2   (!) 140/82 107 18 98.1 °F (36.7 °C) 99 %      MAP       --         Physical Exam    Vitals reviewed.  Constitutional: She appears well-developed and well-nourished. No distress.   HENT:   Head: Normocephalic.   Eyes: Conjunctivae are normal. Pupils are equal, round, and reactive to light.   Cardiovascular:  Normal rate, regular rhythm, normal heart sounds and intact distal pulses.           Pulmonary/Chest: Breath sounds normal.   Abdominal: Abdomen is soft. Bowel sounds are normal.   Musculoskeletal:      Comments: Tenderness over right patella.      Neurological: She is alert and oriented to person, place, and time.   Skin: Skin is warm and dry.   Psychiatric: She has a normal mood and affect.       ED Course   Procedures  Labs Reviewed - No data to display       Imaging Results              X-Ray Knee AP LAT with [place] Right (Final result)  Result time 12/30/22 20:36:47   Procedure changed from X-Ray Knee 3 View Left     Final result by Serafin Rayo MD (12/30/22 20:36:47)                   Impression:      No acute radiographic abnormality.    Place of service: [facility]      Electronically signed by: Serafin Rayo  Date:    12/30/2022  Time:    20:36               Narrative:    EXAMINATION:  XR in knee three views right    CLINICAL HISTORY:  Knee pain, injury    COMPARISON:  Prior left knee radiograph 09/15/2019    FINDINGS:  There is no acute osseous, articular or soft tissue abnormality identified.  No suspected joint effusion.  No radiopaque foreign bodies are identified in the soft tissues.    The right knee joint space appears relatively preserved.                                       Medications   ketorolac injection 60 mg (60 mg Intramuscular Given 12/30/22 1921)   ondansetron disintegrating tablet 4 mg (4 mg Oral Given 12/30/22 1921)   orphenadrine injection 60 mg (60 mg Intramuscular Given 12/30/22 1932)                Attending Attestation:            Physician Attestation for Scribe:  Physician Attestation Statement for Scribe #1: I, Serafin Nunez MD, reviewed documentation, as scribed by Osiris Farr in my presence, and it is both accurate and complete.                        Clinical Impression:   Final diagnoses:  [S89.91XA] Knee injury, right, initial encounter  [T14.90XA] Injury  [S80.01XA] Contusion of right knee, initial encounter (Primary)        ED Disposition Condition    Discharge Stable          ED Prescriptions       Medication Sig Dispense Start Date End Date Auth. Provider    methocarbamoL (ROBAXIN) 500 MG Tab  (Status: Discontinued) Take 2-3 tablets (1,000-1,500 mg total) by mouth 3 (three) times daily. for 5 days 30 tablet 12/30/2022 12/30/2022 Serafin Nunez MD    methocarbamoL (ROBAXIN) 500 MG Tab Take 2-3 tablets (1,000-1,500 mg total) by mouth 3 (three) times daily. for 5 days 30 tablet 12/30/2022 1/4/2023 Serafin Nunez MD          Follow-up Information       Follow up With Specialties Details Why Contact Info    Rudy Roa III, MD Orthopedic Surgery Schedule an appointment as soon as possible for a visit  As needed if you can not get in with your regular orthopedist [address] [place] [address]  [phone]      Celestine Mccollum MD Orthopedic Surgery Schedule an appointment as soon as possible for a visit   [address] [address]  [phone]      [facility] Department Emergency Medicine Go to  As needed [address] [id number] [phone]             Serafin Nunez MD  12/30/22 2136

## 2022-12-31 NOTE — DISCHARGE INSTRUCTIONS
Use immobilizer and crutches as needed.  Follow-up with your orthopedist.  Use ibuprofen and Tylenol as needed.  Use prescription muscle relaxer as needed.  Use ice.  After the initial injury resolves pursue physical therapy.

## 2023-01-03 ENCOUNTER — LAB VISIT (OUTPATIENT)
Dept: PRIMARY CARE CLINIC | Facility: CLINIC | Age: 33
End: 2023-01-03
Payer: COMMERCIAL

## 2023-01-03 DIAGNOSIS — Z02.83 ENCOUNTER FOR DRUG SCREENING: Primary | ICD-10-CM

## 2023-01-03 PROCEDURE — 99000 SPECIMEN HANDLING OFFICE-LAB: CPT | Mod: ,,, | Performed by: NURSE PRACTITIONER

## 2023-01-03 PROCEDURE — 99000 PR SPECIMEN HANDLING,DR OFF->LAB: ICD-10-PCS | Mod: ,,, | Performed by: NURSE PRACTITIONER

## 2023-01-03 NOTE — PROGRESS NOTES
Subjective:       Patient ID: Tiffany Cornejo is a 32 y.o. female.    Chief Complaint: No chief complaint on file.    HPI  Review of Systems      Objective:      Physical Exam    Assessment:       Problem List Items Addressed This Visit    None  Visit Diagnoses       Encounter for drug screening    -  Primary            Plan:       Drug testing only

## 2023-01-12 ENCOUNTER — OFFICE VISIT (OUTPATIENT)
Dept: FAMILY MEDICINE | Facility: CLINIC | Age: 33
End: 2023-01-12
Payer: COMMERCIAL

## 2023-01-12 VITALS
HEART RATE: 88 BPM | WEIGHT: 225 LBS | OXYGEN SATURATION: 98 % | RESPIRATION RATE: 18 BRPM | DIASTOLIC BLOOD PRESSURE: 60 MMHG | BODY MASS INDEX: 36.16 KG/M2 | HEIGHT: 66 IN | SYSTOLIC BLOOD PRESSURE: 104 MMHG

## 2023-01-12 DIAGNOSIS — Z00.00 ROUTINE GENERAL MEDICAL EXAMINATION AT A HEALTH CARE FACILITY: Primary | ICD-10-CM

## 2023-01-12 DIAGNOSIS — G89.29 CHRONIC MIDLINE LOW BACK PAIN WITHOUT SCIATICA: ICD-10-CM

## 2023-01-12 DIAGNOSIS — Z13.1 SCREENING FOR DIABETES MELLITUS: ICD-10-CM

## 2023-01-12 DIAGNOSIS — M54.50 CHRONIC MIDLINE LOW BACK PAIN WITHOUT SCIATICA: ICD-10-CM

## 2023-01-12 DIAGNOSIS — Z13.220 SCREENING FOR LIPOID DISORDERS: ICD-10-CM

## 2023-01-12 DIAGNOSIS — M25.50 ARTHRALGIA, UNSPECIFIED JOINT: ICD-10-CM

## 2023-01-12 LAB
CHOLEST SERPL-MCNC: 165 MG/DL (ref 0–200)
CHOLEST/HDLC SERPL: 2.8 {RATIO}
GLUCOSE SERPL-MCNC: 83 MG/DL (ref 74–106)
HDLC SERPL-MCNC: 60 MG/DL (ref 40–60)
LDLC SERPL CALC-MCNC: 94 MG/DL
LDLC/HDLC SERPL: 1.6 {RATIO}
NONHDLC SERPL-MCNC: 105 MG/DL
TRIGL SERPL-MCNC: 55 MG/DL (ref 35–150)
VLDLC SERPL-MCNC: 11 MG/DL

## 2023-01-12 PROCEDURE — 99395 PR PREVENTIVE VISIT,EST,18-39: ICD-10-PCS | Mod: ,,, | Performed by: FAMILY MEDICINE

## 2023-01-12 PROCEDURE — 3078F PR MOST RECENT DIASTOLIC BLOOD PRESSURE < 80 MM HG: ICD-10-PCS | Mod: ,,, | Performed by: FAMILY MEDICINE

## 2023-01-12 PROCEDURE — 99395 PREV VISIT EST AGE 18-39: CPT | Mod: ,,, | Performed by: FAMILY MEDICINE

## 2023-01-12 PROCEDURE — 80061 LIPID PANEL: ICD-10-PCS | Mod: ,,, | Performed by: CLINICAL MEDICAL LABORATORY

## 2023-01-12 PROCEDURE — 82947 ASSAY GLUCOSE BLOOD QUANT: CPT | Mod: ,,, | Performed by: CLINICAL MEDICAL LABORATORY

## 2023-01-12 PROCEDURE — 82947 GLUCOSE, FASTING: ICD-10-PCS | Mod: ,,, | Performed by: CLINICAL MEDICAL LABORATORY

## 2023-01-12 PROCEDURE — 3074F PR MOST RECENT SYSTOLIC BLOOD PRESSURE < 130 MM HG: ICD-10-PCS | Mod: ,,, | Performed by: FAMILY MEDICINE

## 2023-01-12 PROCEDURE — 36415 PR COLLECTION VENOUS BLOOD,VENIPUNCTURE: ICD-10-PCS | Mod: ,,, | Performed by: CLINICAL MEDICAL LABORATORY

## 2023-01-12 PROCEDURE — 80061 LIPID PANEL: CPT | Mod: ,,, | Performed by: CLINICAL MEDICAL LABORATORY

## 2023-01-12 PROCEDURE — 3008F PR BODY MASS INDEX (BMI) DOCUMENTED: ICD-10-PCS | Mod: ,,, | Performed by: FAMILY MEDICINE

## 2023-01-12 PROCEDURE — 3078F DIAST BP <80 MM HG: CPT | Mod: ,,, | Performed by: FAMILY MEDICINE

## 2023-01-12 PROCEDURE — 1160F RVW MEDS BY RX/DR IN RCRD: CPT | Mod: ,,, | Performed by: FAMILY MEDICINE

## 2023-01-12 PROCEDURE — 1159F PR MEDICATION LIST DOCUMENTED IN MEDICAL RECORD: ICD-10-PCS | Mod: ,,, | Performed by: FAMILY MEDICINE

## 2023-01-12 PROCEDURE — 1159F MED LIST DOCD IN RCRD: CPT | Mod: ,,, | Performed by: FAMILY MEDICINE

## 2023-01-12 PROCEDURE — 36415 COLL VENOUS BLD VENIPUNCTURE: CPT | Mod: ,,, | Performed by: CLINICAL MEDICAL LABORATORY

## 2023-01-12 PROCEDURE — 3074F SYST BP LT 130 MM HG: CPT | Mod: ,,, | Performed by: FAMILY MEDICINE

## 2023-01-12 PROCEDURE — 3008F BODY MASS INDEX DOCD: CPT | Mod: ,,, | Performed by: FAMILY MEDICINE

## 2023-01-12 PROCEDURE — 1160F PR REVIEW ALL MEDS BY PRESCRIBER/CLIN PHARMACIST DOCUMENTED: ICD-10-PCS | Mod: ,,, | Performed by: FAMILY MEDICINE

## 2023-01-12 RX ORDER — NAPROXEN 500 MG/1
TABLET ORAL
Qty: 60 TABLET | Refills: 2 | Status: SHIPPED | OUTPATIENT
Start: 2023-01-12 | End: 2023-11-30 | Stop reason: SDUPTHER

## 2023-01-12 RX ORDER — CYCLOBENZAPRINE HCL 10 MG
TABLET ORAL
Qty: 30 TABLET | Refills: 2 | Status: SHIPPED | OUTPATIENT
Start: 2023-01-12 | End: 2023-11-30 | Stop reason: SDUPTHER

## 2023-01-12 NOTE — PROGRESS NOTES
Tiffany Cornejo is a 32 y.o. female seen today for her healthy you.  She denies any chest pain shortness a breath but discontinued complain of low back pain and diffuse joint pain.  Patient also recently injured her right knee and is currently in a splint.  At this time, patient is at physical therapy for her knee and request an evaluation for her low back pain and diffuse joint pain is well.      Past Medical History:   Diagnosis Date    ADHD (attention deficit hyperactivity disorder)      Family History   Problem Relation Age of Onset    Diabetes Mother     Hypertension Mother     Irritable bowel syndrome Mother     Cancer Maternal Grandfather     Hypertension Maternal Grandfather     Diabetes Maternal Grandfather      Current Outpatient Medications on File Prior to Visit   Medication Sig Dispense Refill    [DISCONTINUED] cyclobenzaprine (FLEXERIL) 10 MG tablet TAKE 1 TABLET BY MOUTH AT BEDTIME WITH FOOD CAUSES DROWSINESS-AVOID ALCOHOL!!      [DISCONTINUED] naproxen (NAPROSYN) 500 MG tablet TAKE 1 TABLET BY MOUTH TWICE A DAY WITH FOOD ...STOP TAKING ALL OTHER NSAIDS WHILE TAKING THIS MEDICATION 60 tablet 2    [DISCONTINUED] atomoxetine (STRATTERA) 60 MG capsule Take 1 capsule (60 mg total) by mouth once daily. 30 capsule 2     No current facility-administered medications on file prior to visit.       There is no immunization history on file for this patient.    Review of Systems   Constitutional:  Negative for fever, malaise/fatigue and weight loss.   Respiratory:  Negative for shortness of breath.    Cardiovascular:  Negative for chest pain and palpitations.   Gastrointestinal:  Negative for nausea and vomiting.   Musculoskeletal:  Positive for back pain, joint pain and myalgias. Negative for falls.   Psychiatric/Behavioral:  Negative for depression.       Vitals:    01/12/23 0846   BP: 104/60   Pulse: 88   Resp: 18       Physical Exam  Vitals reviewed.   Constitutional:       Appearance: Normal appearance.   HENT:       Head: Normocephalic.   Eyes:      Extraocular Movements: Extraocular movements intact.      Conjunctiva/sclera: Conjunctivae normal.      Pupils: Pupils are equal, round, and reactive to light.   Neck:      Thyroid: No thyroid mass or thyromegaly.   Cardiovascular:      Rate and Rhythm: Normal rate and regular rhythm.      Heart sounds: Normal heart sounds. No murmur heard.    No gallop.   Pulmonary:      Effort: Pulmonary effort is normal. No respiratory distress.      Breath sounds: Normal breath sounds. No wheezing or rales.   Musculoskeletal:      Lumbar back: Spasms and tenderness present. Decreased range of motion.      Right knee: Decreased range of motion.      Comments: She is slow to rise from a seated position with an antalgic gait.   Skin:     General: Skin is warm and dry.      Coloration: Skin is not jaundiced or pale.   Neurological:      Mental Status: She is alert.   Psychiatric:         Mood and Affect: Mood normal.         Behavior: Behavior normal.         Thought Content: Thought content normal.         Judgment: Judgment normal.        Assessment and Plan  Screening for diabetes mellitus  -     Glucose, Fasting; Future; Expected date: 01/12/2023    Arthralgia, unspecified joint  -     naproxen (NAPROSYN) 500 MG tablet; TAKE 1 TABLET BY MOUTH TWICE A DAY WITH FOOD ...STOP TAKING ALL OTHER NSAIDS WHILE TAKING THIS MEDICATION  Dispense: 60 tablet; Refill: 2  -     Ambulatory referral/consult to Physical/Occupational Therapy; Future; Expected date: 01/19/2023    Screening for lipoid disorders  -     Lipid Panel; Future; Expected date: 01/12/2023    Chronic midline low back pain without sciatica  -     Ambulatory referral/consult to Physical/Occupational Therapy; Future; Expected date: 01/19/2023    Other orders  -     cyclobenzaprine (FLEXERIL) 10 MG tablet; TAKE 1 TABLET BY MOUTH AT BEDTIME WITH FOOD CAUSES DROWSINESS-AVOID ALCOHOL!!  Dispense: 30 tablet; Refill: 2            Return to clinic  in 6 weeks for follow-up.  May consider a weight loss medication.    The patient has no Health Maintenance topics of status Not Due

## 2023-01-13 ENCOUNTER — TELEPHONE (OUTPATIENT)
Dept: FAMILY MEDICINE | Facility: CLINIC | Age: 33
End: 2023-01-13
Payer: COMMERCIAL

## 2023-01-13 NOTE — TELEPHONE ENCOUNTER
----- Message from Kiran Malin MD sent at 1/13/2023  7:59 AM CST -----  Glucose and lipids were good.

## 2023-06-26 ENCOUNTER — PATIENT OUTREACH (OUTPATIENT)
Dept: ADMINISTRATIVE | Facility: HOSPITAL | Age: 33
End: 2023-06-26

## 2023-06-26 NOTE — PROGRESS NOTES
Health maintenance record review for population health care gaps    Chart review for cervical cancer screen    [facility] and Epic no cervical cancer screen report found      Patient next scheduled appt is 1/22/24 for a Healthy You Visit    Health Maintenance Due   Topic Date Due    Hepatitis C Screening  Never done    Cervical Cancer Screening  Never done    COVID-19 Vaccine (1) Never done    Pneumococcal Vaccines (Age 0-64) (1 - PCV) Never done    HIV Screening  Never done    TETANUS VACCINE  Never done

## 2023-07-13 RX ORDER — CIPROFLOXACIN AND DEXAMETHASONE 3; 1 MG/ML; MG/ML
4 SUSPENSION/ DROPS AURICULAR (OTIC) 2 TIMES DAILY
Qty: 7.5 ML | Refills: 0 | Status: SHIPPED | OUTPATIENT
Start: 2023-07-13 | End: 2023-07-20

## 2023-07-19 RX ORDER — AMOXICILLIN AND CLAVULANATE POTASSIUM 875; 125 MG/1; MG/1
1 TABLET, FILM COATED ORAL 2 TIMES DAILY
Qty: 14 TABLET | Refills: 0 | Status: SHIPPED | OUTPATIENT
Start: 2023-07-19

## 2023-07-20 ENCOUNTER — PATIENT MESSAGE (OUTPATIENT)
Dept: ADMINISTRATIVE | Facility: HOSPITAL | Age: 33
End: 2023-07-20

## 2023-11-30 ENCOUNTER — OFFICE VISIT (OUTPATIENT)
Dept: FAMILY MEDICINE | Facility: CLINIC | Age: 33
End: 2023-11-30
Payer: COMMERCIAL

## 2023-11-30 VITALS
RESPIRATION RATE: 18 BRPM | HEIGHT: 66 IN | HEART RATE: 89 BPM | SYSTOLIC BLOOD PRESSURE: 126 MMHG | TEMPERATURE: 98 F | BODY MASS INDEX: 36.32 KG/M2 | DIASTOLIC BLOOD PRESSURE: 88 MMHG | OXYGEN SATURATION: 98 %

## 2023-11-30 DIAGNOSIS — L73.2 HYDRADENITIS: ICD-10-CM

## 2023-11-30 DIAGNOSIS — R21 DISCOID RASH: Primary | ICD-10-CM

## 2023-11-30 DIAGNOSIS — M25.50 ARTHRALGIA, UNSPECIFIED JOINT: ICD-10-CM

## 2023-11-30 LAB
BASOPHILS # BLD AUTO: 0.06 K/UL (ref 0–0.2)
BASOPHILS NFR BLD AUTO: 0.5 % (ref 0–1)
CRP SERPL-MCNC: <0.29 MG/DL (ref 0–0.8)
DIFFERENTIAL METHOD BLD: ABNORMAL
EOSINOPHIL # BLD AUTO: 0.2 K/UL (ref 0–0.5)
EOSINOPHIL NFR BLD AUTO: 1.7 % (ref 1–4)
ERYTHROCYTE [DISTWIDTH] IN BLOOD BY AUTOMATED COUNT: 13.1 % (ref 11.5–14.5)
ERYTHROCYTE [SEDIMENTATION RATE] IN BLOOD BY WESTERGREN METHOD: 15 MM/HR (ref 0–20)
HCT VFR BLD AUTO: 42.9 % (ref 38–47)
HGB BLD-MCNC: 13.6 G/DL (ref 12–16)
IMM GRANULOCYTES # BLD AUTO: 0.03 K/UL (ref 0–0.04)
IMM GRANULOCYTES NFR BLD: 0.3 % (ref 0–0.4)
LYMPHOCYTES # BLD AUTO: 3.79 K/UL (ref 1–4.8)
LYMPHOCYTES NFR BLD AUTO: 33.1 % (ref 27–41)
MCH RBC QN AUTO: 28.3 PG (ref 27–31)
MCHC RBC AUTO-ENTMCNC: 31.7 G/DL (ref 32–36)
MCV RBC AUTO: 89.2 FL (ref 80–96)
MONOCYTES # BLD AUTO: 1.04 K/UL (ref 0–0.8)
MONOCYTES NFR BLD AUTO: 9.1 % (ref 2–6)
MPC BLD CALC-MCNC: 11.3 FL (ref 9.4–12.4)
NEUTROPHILS # BLD AUTO: 6.34 K/UL (ref 1.8–7.7)
NEUTROPHILS NFR BLD AUTO: 55.3 % (ref 53–65)
NRBC # BLD AUTO: 0 X10E3/UL
NRBC, AUTO (.00): 0 %
PLATELET # BLD AUTO: 287 K/UL (ref 150–400)
RBC # BLD AUTO: 4.81 M/UL (ref 4.2–5.4)
RHEUMATOID FACT SER NEPH-ACNC: NEGATIVE [IU]/ML
SYPHILIS AB INTERPRETATION: NORMAL
WBC # BLD AUTO: 11.46 K/UL (ref 4.5–11)

## 2023-11-30 PROCEDURE — 86780 TREPONEMA PALLIDUM (SYPHILIS) ANTIBODY: ICD-10-PCS | Mod: ,,, | Performed by: CLINICAL MEDICAL LABORATORY

## 2023-11-30 PROCEDURE — 86140 C-REACTIVE PROTEIN: ICD-10-PCS | Mod: ,,, | Performed by: CLINICAL MEDICAL LABORATORY

## 2023-11-30 PROCEDURE — 3008F PR BODY MASS INDEX (BMI) DOCUMENTED: ICD-10-PCS | Mod: ,,, | Performed by: FAMILY MEDICINE

## 2023-11-30 PROCEDURE — 3079F DIAST BP 80-89 MM HG: CPT | Mod: ,,, | Performed by: FAMILY MEDICINE

## 2023-11-30 PROCEDURE — 86140 C-REACTIVE PROTEIN: CPT | Mod: ,,, | Performed by: CLINICAL MEDICAL LABORATORY

## 2023-11-30 PROCEDURE — 86780 TREPONEMA PALLIDUM: CPT | Mod: ,,, | Performed by: CLINICAL MEDICAL LABORATORY

## 2023-11-30 PROCEDURE — 99214 PR OFFICE/OUTPT VISIT, EST, LEVL IV, 30-39 MIN: ICD-10-PCS | Mod: ,,, | Performed by: FAMILY MEDICINE

## 2023-11-30 PROCEDURE — 85651 SEDIMENTATION RATE, AUTOMATED: ICD-10-PCS | Mod: ,,, | Performed by: CLINICAL MEDICAL LABORATORY

## 2023-11-30 PROCEDURE — 1160F RVW MEDS BY RX/DR IN RCRD: CPT | Mod: ,,, | Performed by: FAMILY MEDICINE

## 2023-11-30 PROCEDURE — 86038 ANA EIA W/REFLEX DSDNA/ENA: ICD-10-PCS | Mod: ,,, | Performed by: CLINICAL MEDICAL LABORATORY

## 2023-11-30 PROCEDURE — 86430 RHEUMATOID FACTOR TEST QUAL: CPT | Mod: ,,, | Performed by: CLINICAL MEDICAL LABORATORY

## 2023-11-30 PROCEDURE — 85025 CBC WITH DIFFERENTIAL: ICD-10-PCS | Mod: ,,, | Performed by: CLINICAL MEDICAL LABORATORY

## 2023-11-30 PROCEDURE — 3074F SYST BP LT 130 MM HG: CPT | Mod: ,,, | Performed by: FAMILY MEDICINE

## 2023-11-30 PROCEDURE — 86038 ANTINUCLEAR ANTIBODIES: CPT | Mod: ,,, | Performed by: CLINICAL MEDICAL LABORATORY

## 2023-11-30 PROCEDURE — 1160F PR REVIEW ALL MEDS BY PRESCRIBER/CLIN PHARMACIST DOCUMENTED: ICD-10-PCS | Mod: ,,, | Performed by: FAMILY MEDICINE

## 2023-11-30 PROCEDURE — 85651 RBC SED RATE NONAUTOMATED: CPT | Mod: ,,, | Performed by: CLINICAL MEDICAL LABORATORY

## 2023-11-30 PROCEDURE — 3079F PR MOST RECENT DIASTOLIC BLOOD PRESSURE 80-89 MM HG: ICD-10-PCS | Mod: ,,, | Performed by: FAMILY MEDICINE

## 2023-11-30 PROCEDURE — 1159F MED LIST DOCD IN RCRD: CPT | Mod: ,,, | Performed by: FAMILY MEDICINE

## 2023-11-30 PROCEDURE — 99214 OFFICE O/P EST MOD 30 MIN: CPT | Mod: ,,, | Performed by: FAMILY MEDICINE

## 2023-11-30 PROCEDURE — 85025 COMPLETE CBC W/AUTO DIFF WBC: CPT | Mod: ,,, | Performed by: CLINICAL MEDICAL LABORATORY

## 2023-11-30 PROCEDURE — 3008F BODY MASS INDEX DOCD: CPT | Mod: ,,, | Performed by: FAMILY MEDICINE

## 2023-11-30 PROCEDURE — 86430 RHEUMATOID FACTOR SCREEN: ICD-10-PCS | Mod: ,,, | Performed by: CLINICAL MEDICAL LABORATORY

## 2023-11-30 PROCEDURE — 1159F PR MEDICATION LIST DOCUMENTED IN MEDICAL RECORD: ICD-10-PCS | Mod: ,,, | Performed by: FAMILY MEDICINE

## 2023-11-30 PROCEDURE — 3074F PR MOST RECENT SYSTOLIC BLOOD PRESSURE < 130 MM HG: ICD-10-PCS | Mod: ,,, | Performed by: FAMILY MEDICINE

## 2023-11-30 RX ORDER — CYCLOBENZAPRINE HCL 10 MG
TABLET ORAL
Qty: 30 TABLET | Refills: 2 | Status: SHIPPED | OUTPATIENT
Start: 2023-11-30

## 2023-11-30 RX ORDER — NAPROXEN 500 MG/1
TABLET ORAL
Qty: 60 TABLET | Refills: 2 | Status: SHIPPED | OUTPATIENT
Start: 2023-11-30

## 2023-11-30 NOTE — PROGRESS NOTES
Tiffany Cornejo is a 33 y.o. female seen today for a several month history of a mildly pruritic annular rash typically involving her trunk and proximal extremities.  Patient reports these lesions may be more pruritic after shower.  Currently the only medication she takes is Naprosyn which she is been on for several years.  Patient also has a history of hidradenitis suppurativa and we discussed an evaluation by Dermatology.  Patient will be set up for her wellness visit for January when her new insurance begins.      Past Medical History:   Diagnosis Date    ADHD (attention deficit hyperactivity disorder)      Family History   Problem Relation Age of Onset    Diabetes Mother     Hypertension Mother     Irritable bowel syndrome Mother     Cancer Maternal Grandfather     Hypertension Maternal Grandfather     Diabetes Maternal Grandfather      Current Outpatient Medications on File Prior to Visit   Medication Sig Dispense Refill    [DISCONTINUED] cyclobenzaprine (FLEXERIL) 10 MG tablet TAKE 1 TABLET BY MOUTH AT BEDTIME WITH FOOD CAUSES DROWSINESS-AVOID ALCOHOL!! 30 tablet 2    [DISCONTINUED] naproxen (NAPROSYN) 500 MG tablet TAKE 1 TABLET BY MOUTH TWICE A DAY WITH FOOD ...STOP TAKING ALL OTHER NSAIDS WHILE TAKING THIS MEDICATION 60 tablet 2    amoxicillin-clavulanate 875-125mg (AUGMENTIN) 875-125 mg per tablet Take 1 tablet by mouth 2 (two) times daily. (Patient not taking: Reported on 11/30/2023) 14 tablet 0     No current facility-administered medications on file prior to visit.       There is no immunization history on file for this patient.    Review of Systems   Constitutional:  Negative for fever, malaise/fatigue and weight loss.   Respiratory:  Negative for shortness of breath.    Cardiovascular:  Negative for chest pain and palpitations.   Gastrointestinal:  Negative for nausea and vomiting.   Skin:  Positive for itching and rash.   Psychiatric/Behavioral:  Negative for depression.         Vitals:    11/30/23 1038    BP: 126/88   Pulse: 89   Resp: 18   Temp: 98.4 °F (36.9 °C)       Physical Exam  Eyes:      Conjunctiva/sclera: Conjunctivae normal.      Pupils: Pupils are equal, round, and reactive to light.   Pulmonary:      Effort: Pulmonary effort is normal.   Skin:     Findings: Rash present. Rash is macular and scaling.      Comments: Lesions are approximally 1.5-2 cm round with a sharp border and central scale   Neurological:      Mental Status: She is alert.   Psychiatric:         Mood and Affect: Mood normal.         Behavior: Behavior normal.         Thought Content: Thought content normal.         Judgment: Judgment normal.          Assessment and Plan  1. Discoid rash  -     Syphilis Antibody with reflex to RPR; Future; Expected date: 11/30/2023  -     CBC Auto Differential; Future; Expected date: 11/30/2023  -     EARL EIA w/ Reflex to dsDNA/EDSON; Future; Expected date: 11/30/2023  -     C-Reactive Protein; Future; Expected date: 11/30/2023  -     Sedimentation Rate; Future; Expected date: 11/30/2023  -     Rheumatoid Factor Screen; Future; Expected date: 11/30/2023  -     Ambulatory referral/consult to Dermatology; Future; Expected date: 12/07/2023    2. Arthralgia, unspecified joint  -     cyclobenzaprine (FLEXERIL) 10 MG tablet; TAKE 1 TABLET BY MOUTH AT BEDTIME WITH FOOD CAUSES DROWSINESS-AVOID ALCOHOL!!  Dispense: 30 tablet; Refill: 2  -     naproxen (NAPROSYN) 500 MG tablet; TAKE 1 TABLET BY MOUTH TWICE A DAY WITH FOOD ...STOP TAKING ALL OTHER NSAIDS WHILE TAKING THIS MEDICATION  Dispense: 60 tablet; Refill: 2    3. Hydradenitis  -     Ambulatory referral/consult to Dermatology; Future; Expected date: 12/07/2023             Return to clinic in January for her wellness visit or as needed.    The patient has no Health Maintenance topics of status Not Due

## 2023-12-05 LAB — ANA SER QL: NEGATIVE

## 2023-12-22 ENCOUNTER — TELEPHONE (OUTPATIENT)
Dept: FAMILY MEDICINE | Facility: CLINIC | Age: 33
End: 2023-12-22
Payer: COMMERCIAL

## 2023-12-22 NOTE — TELEPHONE ENCOUNTER
----- Message from Rosa Isabel LPN sent at 12/5/2023  5:23 PM CST -----    ----- Message -----  From: Kiran Malin MD  Sent: 12/5/2023   4:49 PM CST  To: [facility] Staff    Labs are within normal limits

## 2024-04-08 ENCOUNTER — OFFICE VISIT (OUTPATIENT)
Dept: FAMILY MEDICINE | Facility: CLINIC | Age: 34
End: 2024-04-08
Payer: COMMERCIAL

## 2024-04-08 VITALS
WEIGHT: 220 LBS | RESPIRATION RATE: 20 BRPM | TEMPERATURE: 98 F | BODY MASS INDEX: 35.36 KG/M2 | HEART RATE: 94 BPM | HEIGHT: 66 IN | DIASTOLIC BLOOD PRESSURE: 94 MMHG | OXYGEN SATURATION: 98 % | SYSTOLIC BLOOD PRESSURE: 116 MMHG

## 2024-04-08 DIAGNOSIS — R30.0 DYSURIA: ICD-10-CM

## 2024-04-08 DIAGNOSIS — Z11.59 NEED FOR HEPATITIS C SCREENING TEST: ICD-10-CM

## 2024-04-08 DIAGNOSIS — Z11.4 ENCOUNTER FOR SCREENING FOR HIV: ICD-10-CM

## 2024-04-08 DIAGNOSIS — M51.9 LUMBAR DISC DISEASE: Primary | ICD-10-CM

## 2024-04-08 DIAGNOSIS — Z12.4 SCREENING FOR CERVICAL CANCER: ICD-10-CM

## 2024-04-08 DIAGNOSIS — M54.2 NECK PAIN: ICD-10-CM

## 2024-04-08 DIAGNOSIS — Z13.1 SCREENING FOR DIABETES MELLITUS: ICD-10-CM

## 2024-04-08 LAB
BILIRUB SERPL-MCNC: NORMAL MG/DL
BLOOD URINE, POC: NORMAL
COLOR, POC UA: YELLOW
EST. AVERAGE GLUCOSE BLD GHB EST-MCNC: 94 MG/DL
GLUCOSE UR QL STRIP: NORMAL
HBA1C MFR BLD HPLC: 4.9 % (ref 4.5–6.6)
HCV AB SER QL: NORMAL
HIV 1+O+2 AB SERPL QL: NORMAL
KETONES UR QL STRIP: NORMAL
LEUKOCYTE ESTERASE URINE, POC: NORMAL
NITRITE, POC UA: NORMAL
PH, POC UA: 6
PROTEIN, POC: NORMAL
SPECIFIC GRAVITY, POC UA: >=1.03
UROBILINOGEN, POC UA: 0.2

## 2024-04-08 PROCEDURE — 83036 HEMOGLOBIN GLYCOSYLATED A1C: CPT | Mod: ,,, | Performed by: CLINICAL MEDICAL LABORATORY

## 2024-04-08 PROCEDURE — 99214 OFFICE O/P EST MOD 30 MIN: CPT | Mod: ,,, | Performed by: FAMILY MEDICINE

## 2024-04-08 PROCEDURE — 81003 URINALYSIS AUTO W/O SCOPE: CPT | Mod: QW,,, | Performed by: FAMILY MEDICINE

## 2024-04-08 PROCEDURE — 86803 HEPATITIS C AB TEST: CPT | Mod: ,,, | Performed by: CLINICAL MEDICAL LABORATORY

## 2024-04-08 PROCEDURE — 87389 HIV-1 AG W/HIV-1&-2 AB AG IA: CPT | Mod: ,,, | Performed by: CLINICAL MEDICAL LABORATORY

## 2024-04-08 RX ORDER — MELOXICAM 7.5 MG/1
7.5 TABLET ORAL DAILY
Qty: 30 TABLET | Refills: 2 | Status: SHIPPED | OUTPATIENT
Start: 2024-04-08

## 2024-04-08 RX ORDER — BACLOFEN 10 MG/1
10 TABLET ORAL NIGHTLY
Qty: 30 TABLET | Refills: 2 | Status: SHIPPED | OUTPATIENT
Start: 2024-04-08 | End: 2025-04-08

## 2024-04-08 NOTE — PROGRESS NOTES
Tiffany Cornejo is a 33 y.o. female seen today for worsening symptoms of neck pain neck spasm and low back pain spasm.  Patient also has a worsening right shoulder pain and plans to follow up with Orthopedics.  We discussed changing her Naprosyn 2 meloxicam in her Flexeril to baclofen at night.  We discussed follow-up x-rays today and a pain management evaluation.      Past Medical History:   Diagnosis Date    ADHD (attention deficit hyperactivity disorder)      Family History   Problem Relation Age of Onset    Diabetes Mother     Hypertension Mother     Irritable bowel syndrome Mother     Cancer Maternal Grandfather     Hypertension Maternal Grandfather     Diabetes Maternal Grandfather      Current Outpatient Medications on File Prior to Visit   Medication Sig Dispense Refill    naproxen (NAPROSYN) 500 MG tablet TAKE 1 TABLET BY MOUTH TWICE A DAY WITH FOOD ...STOP TAKING ALL OTHER NSAIDS WHILE TAKING THIS MEDICATION 60 tablet 2    [DISCONTINUED] cyclobenzaprine (FLEXERIL) 10 MG tablet TAKE 1 TABLET BY MOUTH AT BEDTIME WITH FOOD CAUSES DROWSINESS-AVOID ALCOHOL!! 30 tablet 2    amoxicillin-clavulanate 875-125mg (AUGMENTIN) 875-125 mg per tablet Take 1 tablet by mouth 2 (two) times daily. (Patient not taking: Reported on 11/30/2023) 14 tablet 0     No current facility-administered medications on file prior to visit.       There is no immunization history on file for this patient.    Review of Systems   Constitutional:  Negative for fever, malaise/fatigue and weight loss.   Respiratory:  Negative for shortness of breath.    Cardiovascular:  Negative for chest pain and palpitations.   Gastrointestinal:  Negative for nausea and vomiting.   Musculoskeletal:  Positive for back pain, joint pain, myalgias and neck pain. Negative for falls.   Psychiatric/Behavioral:  Negative for depression.         Vitals:    04/08/24 0938   BP: (!) 116/94   Pulse: 94   Resp: 20   Temp: 98.3 °F (36.8 °C)       Physical Exam  Vitals reviewed.    Constitutional:       Appearance: Normal appearance.   Eyes:      Conjunctiva/sclera: Conjunctivae normal.   Pulmonary:      Effort: Pulmonary effort is normal.   Musculoskeletal:      Cervical back: Spasms and tenderness present. Decreased range of motion.      Lumbar back: Spasms and tenderness present. Decreased range of motion.        Back:    Neurological:      Mental Status: She is alert.   Psychiatric:         Mood and Affect: Mood normal.         Behavior: Behavior normal.         Thought Content: Thought content normal.         Judgment: Judgment normal.          Assessment and Plan  1. Lumbar disc disease  -     X-Ray Lumbar Spine Ap And Lateral; Future; Expected date: 04/08/2024  -     baclofen (LIORESAL) 10 MG tablet; Take 1 tablet (10 mg total) by mouth every evening.  Dispense: 30 tablet; Refill: 2  -     Ambulatory referral/consult to Pain Clinic; Future; Expected date: 04/15/2024  -     meloxicam (MOBIC) 7.5 MG tablet; Take 1 tablet (7.5 mg total) by mouth once daily.  Dispense: 30 tablet; Refill: 2    2. Screening for cervical cancer    3. Encounter for screening for HIV  -     HIV 1/2 Ag/Ab (4th Gen); Future; Expected date: 04/08/2024    4. Need for hepatitis C screening test  -     Hepatitis C Antibody; Future; Expected date: 04/08/2024    5. Screening for diabetes mellitus  -     Hemoglobin A1C; Future; Expected date: 04/08/2024    6. Dysuria  -     POCT URINALYSIS W/O SCOPE    7. Neck pain  -     X-Ray Cervical Spine 2 or 3 Views; Future; Expected date: 04/08/2024  -     baclofen (LIORESAL) 10 MG tablet; Take 1 tablet (10 mg total) by mouth every evening.  Dispense: 30 tablet; Refill: 2  -     Ambulatory referral/consult to Pain Clinic; Future; Expected date: 04/15/2024  -     meloxicam (MOBIC) 7.5 MG tablet; Take 1 tablet (7.5 mg total) by mouth once daily.  Dispense: 30 tablet; Refill: 2             Return to clinic in 1 month or as needed.  We will recheck her blood pressure at that  visit.    The patient has no Health Maintenance topics of status Not Due

## 2024-04-10 ENCOUNTER — TELEPHONE (OUTPATIENT)
Dept: FAMILY MEDICINE | Facility: CLINIC | Age: 34
End: 2024-04-10
Payer: OTHER MISCELLANEOUS

## 2024-04-10 NOTE — TELEPHONE ENCOUNTER
----- Message from Kiran Malin MD sent at 4/8/2024  3:30 PM CDT -----  Patient has worsening degeneration of her endplate at C4 through C7 which has progressed since 2021.  Patient has chronic lumbar disc disease that is stable.

## 2024-04-10 NOTE — TELEPHONE ENCOUNTER
----- Message from Kiran Malin MD sent at 4/9/2024  7:59 AM CDT -----  Negative HIV hepatitis C and normal A1c

## 2024-04-10 NOTE — TELEPHONE ENCOUNTER
Pt notified that her xray showed worsening endplate degeneration at c4-c7 which has worsened since 2021. She was also notified that she has DDD of LS which has remained stable and she voiced understanding.

## 2024-07-15 ENCOUNTER — OFFICE VISIT (OUTPATIENT)
Dept: FAMILY MEDICINE | Facility: CLINIC | Age: 34
End: 2024-07-15
Payer: COMMERCIAL

## 2024-07-15 VITALS
DIASTOLIC BLOOD PRESSURE: 88 MMHG | HEART RATE: 99 BPM | OXYGEN SATURATION: 96 % | WEIGHT: 220 LBS | SYSTOLIC BLOOD PRESSURE: 122 MMHG | RESPIRATION RATE: 17 BRPM | HEIGHT: 66 IN | TEMPERATURE: 98 F | BODY MASS INDEX: 35.36 KG/M2

## 2024-07-15 DIAGNOSIS — R05.3 CHRONIC COUGH: Primary | ICD-10-CM

## 2024-07-15 DIAGNOSIS — T14.8XXA BRUISING: ICD-10-CM

## 2024-07-15 DIAGNOSIS — M25.50 ARTHRALGIA, UNSPECIFIED JOINT: ICD-10-CM

## 2024-07-15 PROCEDURE — 96372 THER/PROPH/DIAG INJ SC/IM: CPT | Mod: ,,, | Performed by: FAMILY MEDICINE

## 2024-07-15 PROCEDURE — 99214 OFFICE O/P EST MOD 30 MIN: CPT | Mod: 25,,, | Performed by: FAMILY MEDICINE

## 2024-07-15 RX ORDER — ALBUTEROL SULFATE 90 UG/1
2 AEROSOL, METERED RESPIRATORY (INHALATION) EVERY 6 HOURS PRN
Qty: 18 G | Refills: 3 | Status: SHIPPED | OUTPATIENT
Start: 2024-07-15 | End: 2025-07-15

## 2024-07-15 RX ORDER — DEXAMETHASONE SODIUM PHOSPHATE 4 MG/ML
8 INJECTION, SOLUTION INTRA-ARTICULAR; INTRALESIONAL; INTRAMUSCULAR; INTRAVENOUS; SOFT TISSUE
Status: COMPLETED | OUTPATIENT
Start: 2024-07-15 | End: 2024-07-15

## 2024-07-15 RX ADMIN — DEXAMETHASONE SODIUM PHOSPHATE 8 MG: 4 INJECTION, SOLUTION INTRA-ARTICULAR; INTRALESIONAL; INTRAMUSCULAR; INTRAVENOUS; SOFT TISSUE at 10:07

## 2024-07-15 NOTE — PROGRESS NOTES
Tiffany Cornejo is a 33 y.o. female seen today for chronic cough over the last month or so.  Patient does have a history of cigarette smoking and is currently vaping.  She also has a chronic rash that comes and goes and is responsive to steroid injections.  Patient also has chronic joint pain and we had discussed rheumatology evaluation in the past and unfortunately the patient missed her appointment.  She would like this rescheduled if possible.  Patient is also requesting evaluation and medication for ADHD and she will need an evaluation by mental health.  She reports she was set this up.    Past Medical History:   Diagnosis Date    ADHD (attention deficit hyperactivity disorder)      Family History   Problem Relation Name Age of Onset    Diabetes Mother      Hypertension Mother      Irritable bowel syndrome Mother      Cancer Maternal Grandfather      Hypertension Maternal Grandfather      Diabetes Maternal Grandfather       Current Outpatient Medications on File Prior to Visit   Medication Sig Dispense Refill    baclofen (LIORESAL) 10 MG tablet Take 1 tablet (10 mg total) by mouth every evening. 30 tablet 2    meloxicam (MOBIC) 7.5 MG tablet Take 1 tablet (7.5 mg total) by mouth once daily. 30 tablet 2    naproxen (NAPROSYN) 500 MG tablet TAKE 1 TABLET BY MOUTH TWICE A DAY WITH FOOD ...STOP TAKING ALL OTHER NSAIDS WHILE TAKING THIS MEDICATION 60 tablet 2    amoxicillin-clavulanate 875-125mg (AUGMENTIN) 875-125 mg per tablet Take 1 tablet by mouth 2 (two) times daily. (Patient not taking: Reported on 11/30/2023) 14 tablet 0     No current facility-administered medications on file prior to visit.       There is no immunization history on file for this patient.    Review of Systems   Constitutional:  Negative for fever, malaise/fatigue and weight loss.   Respiratory:  Positive for cough and sputum production. Negative for shortness of breath.    Cardiovascular:  Negative for chest pain and palpitations.    Gastrointestinal:  Negative for nausea and vomiting.   Musculoskeletal:  Positive for joint pain.   Skin:  Positive for rash.   Psychiatric/Behavioral:  Negative for depression.         Vitals:    07/15/24 0910   BP: 122/88   Pulse: 99   Resp: 17   Temp: 98.3 °F (36.8 °C)       Physical Exam  Vitals reviewed.   Eyes:      Conjunctiva/sclera: Conjunctivae normal.   Cardiovascular:      Rate and Rhythm: Regular rhythm. Bradycardia present.   Pulmonary:      Breath sounds: Decreased breath sounds and rhonchi present.   Neurological:      General: No focal deficit present.   Psychiatric:         Mood and Affect: Mood normal.         Behavior: Behavior normal.         Thought Content: Thought content normal.         Judgment: Judgment normal.          Assessment and Plan  1. Chronic cough  -     X-Ray Chest PA And Lateral; Future; Expected date: 07/15/2024  -     albuterol (PROAIR HFA) 90 mcg/actuation inhaler; Inhale 2 puffs into the lungs every 6 (six) hours as needed for Wheezing. Rescue  Dispense: 18 g; Refill: 3  -     Complete PFT w/ bronchodilator; Future  -     dexAMETHasone injection 8 mg    2. Bruising  -     Protime-INR; Future; Expected date: 07/15/2024  -     APTT; Future; Expected date: 07/15/2024    3. Arthralgia, unspecified joint  -     Ambulatory referral/consult to Rheumatology; Future; Expected date: 07/22/2024  -     dexAMETHasone injection 8 mg             Return to clinic in 2-3 months or as needed if symptoms worsen or persist.    Health Maintenance Topics with due status: Not Due       Topic Last Completion Date    Influenza Vaccine 04/08/2024    Hemoglobin A1c (Diabetic Prevention Screening) 04/08/2024

## 2024-08-12 ENCOUNTER — TELEPHONE (OUTPATIENT)
Dept: FAMILY MEDICINE | Facility: CLINIC | Age: 34
End: 2024-08-12
Payer: COMMERCIAL

## 2024-08-12 DIAGNOSIS — M25.50 ARTHRALGIA, UNSPECIFIED JOINT: Primary | ICD-10-CM

## 2024-08-12 NOTE — TELEPHONE ENCOUNTER
----- Message from Ene Michelle MA sent at 8/12/2024  1:53 PM CDT -----  Pt says Dr Malin was wanting her to call when she checked to see if Dr Nilton Andrade could see her sooner and Dr Andrade can see her next month.  Pt is needing referral put in for Dr Andrade. She has already talked with his office and they are waiting on the referral.  Fax is .

## 2024-11-20 ENCOUNTER — OFFICE VISIT (OUTPATIENT)
Dept: FAMILY MEDICINE | Facility: CLINIC | Age: 34
End: 2024-11-20

## 2024-11-20 VITALS
DIASTOLIC BLOOD PRESSURE: 72 MMHG | RESPIRATION RATE: 20 BRPM | HEIGHT: 66 IN | OXYGEN SATURATION: 99 % | TEMPERATURE: 98 F | WEIGHT: 219.5 LBS | SYSTOLIC BLOOD PRESSURE: 126 MMHG | HEART RATE: 92 BPM | BODY MASS INDEX: 35.27 KG/M2

## 2024-11-20 DIAGNOSIS — R13.10 DYSPHAGIA, UNSPECIFIED TYPE: Primary | ICD-10-CM

## 2024-11-20 DIAGNOSIS — Z11.59 SCREENING FOR VIRAL DISEASE: ICD-10-CM

## 2024-11-20 DIAGNOSIS — R49.0 HOARSENESS: ICD-10-CM

## 2024-11-20 DIAGNOSIS — Z23 NEED FOR IMMUNIZATION AGAINST INFLUENZA: ICD-10-CM

## 2024-11-20 PROCEDURE — 90471 IMMUNIZATION ADMIN: CPT | Mod: ,,, | Performed by: FAMILY MEDICINE

## 2024-11-20 PROCEDURE — 90661 CCIIV3 VAC ABX FR 0.5 ML IM: CPT | Mod: ,,, | Performed by: FAMILY MEDICINE

## 2024-11-20 PROCEDURE — 99213 OFFICE O/P EST LOW 20 MIN: CPT | Mod: 25,,, | Performed by: FAMILY MEDICINE

## 2024-11-20 RX ORDER — OMEPRAZOLE 20 MG/1
20 CAPSULE, DELAYED RELEASE ORAL DAILY
Qty: 30 CAPSULE | Refills: 5 | Status: SHIPPED | OUTPATIENT
Start: 2024-11-20 | End: 2025-11-20

## 2024-11-20 NOTE — PROGRESS NOTES
Tiffany Cornejo is a 34 y.o. female seen today for increased difficulty with swallowing both solids and liquids that is intermittent.  She has also noticed more symptoms of hoarseness.  The patient does have a history of reflux while she was pregnant but currently denies obvious symptoms.  Still we discussed avoiding alcohol caffeine and chocolate as much as possible and we will provide a trial of Prilosec.  With her symptoms also think a GI evaluation is in order to rule out stricture.    Past Medical History:   Diagnosis Date    ADHD (attention deficit hyperactivity disorder)      Family History   Problem Relation Name Age of Onset    Diabetes Mother      Hypertension Mother      Irritable bowel syndrome Mother      Cancer Maternal Grandfather      Hypertension Maternal Grandfather      Diabetes Maternal Grandfather       Current Outpatient Medications on File Prior to Visit   Medication Sig Dispense Refill    albuterol (PROAIR HFA) 90 mcg/actuation inhaler Inhale 2 puffs into the lungs every 6 (six) hours as needed for Wheezing. Rescue 18 g 3    baclofen (LIORESAL) 10 MG tablet Take 1 tablet (10 mg total) by mouth every evening. 30 tablet 2    meloxicam (MOBIC) 7.5 MG tablet Take 1 tablet (7.5 mg total) by mouth once daily. 30 tablet 2    naproxen (NAPROSYN) 500 MG tablet TAKE 1 TABLET BY MOUTH TWICE A DAY WITH FOOD ...STOP TAKING ALL OTHER NSAIDS WHILE TAKING THIS MEDICATION 60 tablet 2    [DISCONTINUED] amoxicillin-clavulanate 875-125mg (AUGMENTIN) 875-125 mg per tablet Take 1 tablet by mouth 2 (two) times daily. (Patient not taking: Reported on 11/30/2023) 14 tablet 0     No current facility-administered medications on file prior to visit.     Immunization History   Administered Date(s) Administered    Influenza - Trivalent - Flucelvax - PF 11/20/2024       Review of Systems   HENT:  Negative for hearing loss.    Eyes:  Negative for discharge.   Respiratory:  Negative for wheezing.    Cardiovascular:  Negative  for chest pain and palpitations.   Gastrointestinal:  Negative for blood in stool, constipation, diarrhea and vomiting.   Genitourinary:  Negative for dysuria and hematuria.   Musculoskeletal:  Positive for neck pain.   Neurological:  Negative for weakness and headaches.   Endo/Heme/Allergies:  Positive for polydipsia.        Vitals:    11/20/24 1501   BP: 126/72   Pulse: 92   Resp: 20   Temp: 98.4 °F (36.9 °C)       Physical Exam  Vitals reviewed.   Eyes:      Conjunctiva/sclera: Conjunctivae normal.   Pulmonary:      Effort: Pulmonary effort is normal.   Neurological:      Mental Status: She is alert.   Psychiatric:         Mood and Affect: Mood normal.         Behavior: Behavior normal.         Thought Content: Thought content normal.         Judgment: Judgment normal.          Assessment and Plan  1. Dysphagia, unspecified type  -     omeprazole (PRILOSEC) 20 MG capsule; Take 1 capsule (20 mg total) by mouth once daily.  Dispense: 30 capsule; Refill: 5  -     Ambulatory referral/consult to Gastroenterology; Future; Expected date: 11/27/2024    2. Hoarseness  -     omeprazole (PRILOSEC) 20 MG capsule; Take 1 capsule (20 mg total) by mouth once daily.  Dispense: 30 capsule; Refill: 5  -     Ambulatory referral/consult to Gastroenterology; Future; Expected date: 11/27/2024    3. Need for immunization against influenza  -     influenza (Egg-FREE) (Flucelvax) 45 mcg/0.5 mL IM vaccine (> or = 6 mo) 0.5 mL    4. Screening for viral disease  -     Varicella Zoster Antibody, IgM; Future; Expected date: 11/20/2024             Return to clinic in one month or so for her wellness visit for diabetes screening with her symptoms of polydipsia.  Otherwise we will follow the patient up regarding these symptoms in 2 months or so.    Health Maintenance Topics with due status: Not Due       Topic Last Completion Date    Hemoglobin A1c (Diabetic Prevention Screening) 04/08/2024    RSV Vaccine (Age 60+ and Pregnant patients) Not Due          Answers submitted by the patient for this visit:  Review of Systems Questionnaire (Submitted on 11/13/2024)  activity change: Yes  unexpected weight change: Yes  rhinorrhea: No  trouble swallowing: Yes  visual disturbance: No  chest tightness: No  polyuria: No  difficulty urinating: No  menstrual problem: No  arthralgias: Yes  confusion: No  dysphoric mood: No

## 2024-11-22 LAB — VZV IGM SER QL IF: NEGATIVE

## 2024-12-04 ENCOUNTER — TELEPHONE (OUTPATIENT)
Dept: FAMILY MEDICINE | Facility: CLINIC | Age: 34
End: 2024-12-04
Payer: OTHER MISCELLANEOUS

## 2024-12-04 NOTE — TELEPHONE ENCOUNTER
----- Message from Kiran Malin MD sent at 11/25/2024  8:05 AM CST -----  Negative long-term immunity to varicella so the patient will likely need to be immunized.

## 2024-12-05 ENCOUNTER — PATIENT MESSAGE (OUTPATIENT)
Dept: FAMILY MEDICINE | Facility: CLINIC | Age: 34
End: 2024-12-05
Payer: OTHER MISCELLANEOUS

## 2025-01-22 ENCOUNTER — TELEPHONE (OUTPATIENT)
Dept: FAMILY MEDICINE | Facility: CLINIC | Age: 35
End: 2025-01-22
Payer: OTHER MISCELLANEOUS

## 2025-01-22 NOTE — TELEPHONE ENCOUNTER
----- Message from Med Assistant Luque sent at 1/22/2025  1:34 PM CST -----  Pt is requesting a call back at  concerning a vaccine she had discussed with Dr Malin.

## 2025-01-23 ENCOUNTER — OFFICE VISIT (OUTPATIENT)
Dept: FAMILY MEDICINE | Facility: CLINIC | Age: 35
End: 2025-01-23
Payer: COMMERCIAL

## 2025-01-23 VITALS
SYSTOLIC BLOOD PRESSURE: 126 MMHG | TEMPERATURE: 98 F | OXYGEN SATURATION: 98 % | HEIGHT: 66 IN | RESPIRATION RATE: 20 BRPM | HEART RATE: 102 BPM | BODY MASS INDEX: 34.25 KG/M2 | DIASTOLIC BLOOD PRESSURE: 80 MMHG | WEIGHT: 213.13 LBS

## 2025-01-23 DIAGNOSIS — Z11.59 SCREENING FOR VIRAL DISEASE: Primary | ICD-10-CM

## 2025-01-23 PROCEDURE — 1160F RVW MEDS BY RX/DR IN RCRD: CPT | Mod: ,,, | Performed by: FAMILY MEDICINE

## 2025-01-23 PROCEDURE — 3079F DIAST BP 80-89 MM HG: CPT | Mod: ,,, | Performed by: FAMILY MEDICINE

## 2025-01-23 PROCEDURE — 99212 OFFICE O/P EST SF 10 MIN: CPT | Mod: ,,, | Performed by: FAMILY MEDICINE

## 2025-01-23 PROCEDURE — 86787 VARICELLA-ZOSTER ANTIBODY: CPT | Mod: ,,, | Performed by: CLINICAL MEDICAL LABORATORY

## 2025-01-23 PROCEDURE — 3074F SYST BP LT 130 MM HG: CPT | Mod: ,,, | Performed by: FAMILY MEDICINE

## 2025-01-23 PROCEDURE — 1159F MED LIST DOCD IN RCRD: CPT | Mod: ,,, | Performed by: FAMILY MEDICINE

## 2025-01-23 PROCEDURE — 3008F BODY MASS INDEX DOCD: CPT | Mod: ,,, | Performed by: FAMILY MEDICINE

## 2025-01-23 RX ORDER — DULOXETIN HYDROCHLORIDE 20 MG/1
20 CAPSULE, DELAYED RELEASE ORAL DAILY
COMMUNITY

## 2025-01-23 RX ORDER — DEXTROAMPHETAMINE SACCHARATE, AMPHETAMINE ASPARTATE MONOHYDRATE, DEXTROAMPHETAMINE SULFATE AND AMPHETAMINE SULFATE 1.25; 1.25; 1.25; 1.25 MG/1; MG/1; MG/1; MG/1
5 CAPSULE, EXTENDED RELEASE ORAL DAILY
COMMUNITY

## 2025-01-23 NOTE — PROGRESS NOTES
Tiffany Cornejo is a 34 y.o. female seen today for follow-up on her hidradenitis suppurativa.  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  Unfortunately per this states she never received it and an IgM titer was done instead of an IgG and we will order an IgG titer today.  If her IgG titer is negative she will need a letter from her dermatologist and has an upcoming appointment in February.  Patient is also had episodes of mild lightheadedness especially she raise his too quickly but admits that her oral intake of liquids is very poor and we discussed increasing her water intake and monitoring her fluid status.     Past Medical History:   Diagnosis Date    ADHD (attention deficit hyperactivity disorder)      Family History   Problem Relation Name Age of Onset    Diabetes Mother      Hypertension Mother      Irritable bowel syndrome Mother      Cancer Maternal Grandfather      Hypertension Maternal Grandfather      Diabetes Maternal Grandfather       Current Outpatient Medications on File Prior to Visit   Medication Sig Dispense Refill    albuterol (PROAIR HFA) 90 mcg/actuation inhaler Inhale 2 puffs into the lungs every 6 (six) hours as needed for Wheezing. Rescue 18 g 3    baclofen (LIORESAL) 10 MG tablet Take 1 tablet (10 mg total) by mouth every evening. 30 tablet 2    dextroamphetamine-amphetamine (ADDERALL XR) 5 MG 24 hr capsule Take 5 mg by mouth once daily.      DULoxetine (CYMBALTA) 20 MG capsule Take 20 mg by mouth once daily.      meloxicam (MOBIC) 7.5 MG tablet Take 1 tablet (7.5 mg total) by mouth once daily. 30 tablet 2    naproxen (NAPROSYN) 500 MG tablet TAKE 1 TABLET BY MOUTH TWICE A DAY WITH FOOD ...STOP TAKING ALL OTHER NSAIDS WHILE TAKING THIS MEDICATION 60 tablet 2    omeprazole (PRILOSEC) 20 MG capsule Take 1 capsule (20 mg total) by mouth once daily. 30 capsule 5     No current facility-administered medications on file prior to  visit.     Immunization History   Administered Date(s) Administered    Influenza - Trivalent - Flucelvax - PF 11/20/2024       Review of Systems   Constitutional:  Negative for fever, malaise/fatigue and weight loss.   HENT:  Negative for hearing loss.    Eyes:  Negative for discharge.   Respiratory:  Negative for shortness of breath and wheezing.    Cardiovascular:  Positive for palpitations. Negative for chest pain.   Gastrointestinal:  Negative for blood in stool, constipation, diarrhea, nausea and vomiting.   Genitourinary:  Negative for dysuria and hematuria.   Musculoskeletal:  Positive for neck pain.   Skin:  Positive for rash.   Neurological:  Positive for dizziness and headaches. Negative for weakness.   Endo/Heme/Allergies:  Negative for polydipsia.   Psychiatric/Behavioral:  Negative for depression.         Vitals:    01/23/25 1324   BP: 126/80   Pulse: 102   Resp: 20   Temp: 98.3 °F (36.8 °C)       Physical Exam  Vitals reviewed.   Eyes:      Conjunctiva/sclera: Conjunctivae normal.   Pulmonary:      Effort: Pulmonary effort is normal.   Neurological:      Mental Status: She is alert.   Psychiatric:         Mood and Affect: Mood normal.         Behavior: Behavior normal.         Thought Content: Thought content normal.         Judgment: Judgment normal.          Assessment and Plan  1. Screening for viral disease  -     Varicella Zoster Antibody, IgG; Future; Expected date: 01/23/2025             Return to clinic for her routine visit or as needed.  We discussed increasing her hydration status and following up if her lightheadedness persist.    Health Maintenance Topics with due status: Not Due       Topic Last Completion Date    Hemoglobin A1c (Diabetic Prevention Screening) 04/08/2024    RSV Vaccine (Age 60+ and Pregnant patients) Not Due         Answers submitted by the patient for this visit:  Review of Systems Questionnaire (Submitted on 1/23/2025)  activity change: No  unexpected weight change:  No  rhinorrhea: No  trouble swallowing: Yes  visual disturbance: No  chest tightness: No  polyuria: No  difficulty urinating: No  menstrual problem: No  joint swelling: Yes  arthralgias: Yes  confusion: No  dysphoric mood: No

## 2025-01-29 LAB
VARICELLA ZOSTER, BLOOD: POSITIVE
VZV IGG INDEX: >3 (ref 0–0.9)

## 2025-02-04 ENCOUNTER — OFFICE VISIT (OUTPATIENT)
Dept: GASTROENTEROLOGY | Facility: CLINIC | Age: 35
End: 2025-02-04
Payer: COMMERCIAL

## 2025-02-04 ENCOUNTER — TELEPHONE (OUTPATIENT)
Dept: FAMILY MEDICINE | Facility: CLINIC | Age: 35
End: 2025-02-04
Payer: COMMERCIAL

## 2025-02-04 VITALS
HEART RATE: 99 BPM | WEIGHT: 218 LBS | DIASTOLIC BLOOD PRESSURE: 87 MMHG | OXYGEN SATURATION: 100 % | HEIGHT: 66 IN | BODY MASS INDEX: 35.03 KG/M2 | SYSTOLIC BLOOD PRESSURE: 148 MMHG

## 2025-02-04 DIAGNOSIS — R49.0 HOARSENESS: ICD-10-CM

## 2025-02-04 DIAGNOSIS — R13.14 PHARYNGOESOPHAGEAL DYSPHAGIA: Primary | ICD-10-CM

## 2025-02-04 PROCEDURE — 99214 OFFICE O/P EST MOD 30 MIN: CPT | Mod: PBBFAC | Performed by: INTERNAL MEDICINE

## 2025-02-04 PROCEDURE — 3079F DIAST BP 80-89 MM HG: CPT | Mod: ,,, | Performed by: INTERNAL MEDICINE

## 2025-02-04 PROCEDURE — 3077F SYST BP >= 140 MM HG: CPT | Mod: ,,, | Performed by: INTERNAL MEDICINE

## 2025-02-04 PROCEDURE — 99999 PR PBB SHADOW E&M-EST. PATIENT-LVL IV: CPT | Mod: PBBFAC,,, | Performed by: INTERNAL MEDICINE

## 2025-02-04 PROCEDURE — 99204 OFFICE O/P NEW MOD 45 MIN: CPT | Mod: S$PBB,,, | Performed by: INTERNAL MEDICINE

## 2025-02-04 PROCEDURE — 3008F BODY MASS INDEX DOCD: CPT | Mod: ,,, | Performed by: INTERNAL MEDICINE

## 2025-02-04 PROCEDURE — 1159F MED LIST DOCD IN RCRD: CPT | Mod: ,,, | Performed by: INTERNAL MEDICINE

## 2025-02-04 NOTE — PATIENT INSTRUCTIONS
Schedule EGD for dysphagia  Continue current medications  Follow up in clinic as needed unless endoscopy dictates otherwise

## 2025-02-04 NOTE — PROGRESS NOTES
Gastroenterology Clinic Note  Patient ID: [id number]   Referring MD: Kiran Malin MD   Chief Complaint:   Chief Complaint   Patient presents with    Dysphagia       History of Present Illness   Tiffany Cornejo is an 34 y.o. female with no significant PMH who is referred for dysphagia. Patient reports pharyngoesopheal dysphagia to solids for at least several months. Requires multiple sips of liquids to pass food. She denies alcohol abuse, hematemesis, CGE, abdominal pain, rectal bleeding, wt loss, diarrhea. No prior endoscopy. No H GI related cancer. She reports having heartburn symptoms that have been present for some time as well, but these improved after starting prilosec which was prescribed by Dr. Dunbar. Her dysphagia persists though which is why she presents today.     Previous workup: none    Past Medical History      Past Medical History:   Diagnosis Date    ADHD (attention deficit hyperactivity disorder)        Past Surgical History     Past Surgical History:   Procedure Laterality Date     SECTION      EXCISION OF HIDRADENITIS      GALLBLADDER SURGERY      HIP SURGERY      KNEE SURGERY         Allergies     Review of patient's allergies indicates:   Allergen Reactions    Gluten protein        Immunization History     Immunization History   Administered Date(s) Administered    Influenza - Trivalent - Flucelvax - PF 2024       Past Family History      Family History   Problem Relation Name Age of Onset    Diabetes Mother      Hypertension Mother      Irritable bowel syndrome Mother      Cancer Maternal Grandfather      Hypertension Maternal Grandfather      Diabetes Maternal Grandfather         Past Social History      Social History     Socioeconomic History    Marital status: Single   Tobacco Use    Smoking status: Every Day     Types: Vaping with nicotine     Passive exposure: Current    Smokeless tobacco: Never   Substance and Sexual Activity    Alcohol use: Yes     Comment:  Socially    Drug use: Never    Sexual activity: Yes     Partners: Male     Birth control/protection: None     Social Drivers of Health     Financial Resource Strain: Patient Declined (1/23/2025)    Overall Financial Resource Strain (CARDIA)     Difficulty of Paying Living Expenses: Patient declined   Food Insecurity: Patient Declined (1/23/2025)    Hunger Vital Sign     Worried About Running Out of Food in the Last Year: Patient declined     Ran Out of Food in the Last Year: Patient declined   Transportation Needs: No Transportation Needs (11/27/2023)    PRAPARE - Transportation     Lack of Transportation (Medical): No     Lack of Transportation (Non-Medical): No   Physical Activity: Insufficiently Active (1/23/2025)    Exercise Vital Sign     Days of Exercise per Week: 4 days     Minutes of Exercise per Session: 30 min   Stress: Stress Concern Present (1/23/2025)    [employer] - Occupational Stress Questionnaire     Feeling of Stress : To some extent   Housing Stability: Unknown (1/23/2025)    Housing Stability Vital Sign     Unable to Pay for Housing in the Last Year: Patient declined       Current Medications     Outpatient Medications Marked as Taking for the 2/4/25 encounter (Office Visit) with Jerardo Killian MD   Medication Sig Dispense Refill    albuterol (PROAIR HFA) 90 mcg/actuation inhaler Inhale 2 puffs into the lungs every 6 (six) hours as needed for Wheezing. Rescue 18 g 3    baclofen (LIORESAL) 10 MG tablet Take 1 tablet (10 mg total) by mouth every evening. 30 tablet 2    dextroamphetamine-amphetamine (ADDERALL XR) 5 MG 24 hr capsule Take 5 mg by mouth once daily.      DULoxetine (CYMBALTA) 20 MG capsule Take 20 mg by mouth once daily.      meloxicam (MOBIC) 7.5 MG tablet Take 1 tablet (7.5 mg total) by mouth once daily. 30 tablet 2    naproxen (NAPROSYN) 500 MG tablet TAKE 1 TABLET BY MOUTH TWICE A DAY WITH FOOD ...STOP TAKING ALL OTHER NSAIDS WHILE TAKING THIS MEDICATION  "60 tablet 2    omeprazole (PRILOSEC) 20 MG capsule Take 1 capsule (20 mg total) by mouth once daily. 30 capsule 5        I have reviewed the current medications, allergies, vital signs, past medical and surgical history, family medical history, and social history for this encounter and agree with all findings.    OBJECTIVE    Physical Exam    BP (!) 148/87 (BP Location: Left arm, Patient Position: Sitting)   Pulse 99   Ht 5' 6" (1.676 m)   Wt 98.9 kg (218 lb)   LMP 01/23/2025   SpO2 100%   BMI 35.19 kg/m²   GEN: well appearing, cooperative, NAD  HEENT: NCAT, OP benign, MMM  NECK: Supple, no LAD  CV: normal rate, regular rhythm  RESP: CTA bilaterally, unlabored  ABD: NABS, ND, NT, soft, no guarding  EXT: No clubbing, cyanosis, or edema  SKIN: Warm and dry  NEURO: AAO x4. Afocal.    LABS    CBC (with or without Differential):   Lab Results   Component Value Date    WBC 11.46 (H) 11/30/2023    HGB 13.6 11/30/2023    HCT 42.9 11/30/2023    MCV 89.2 11/30/2023    MCH 28.3 11/30/2023    MCHC 31.7 (L) 11/30/2023    RDW 13.1 11/30/2023     11/30/2023    MPV 11.3 11/30/2023    NEUTOPHILPCT 55.3 11/30/2023    DIFFTYPE Auto 11/30/2023     BMP/CMP:   Lab Results   Component Value Date     09/21/2022    K 3.9 09/21/2022     (H) 09/21/2022    CO2 26 09/21/2022    BUN 8 09/21/2022    CREATININE 1.00 09/21/2022    GLU 83 09/21/2022    CALCIUM 9.1 09/21/2022    ALBUMIN 4.1 09/21/2022    AST 12 (L) 09/21/2022    ALT 21 09/21/2022    ALKPHOS 73 09/21/2022    MG 2.2 09/21/2022      Labs reviewed, no recent labs     IMAGING  Imaging reviewed, no pertinent GI imaging    ASSESSMENT  Tiffany Cornejo is a 34 y.o. female with no significant PMH who is referred for dysphagia.     1. Pharyngoesophageal dysphagia    2. Hoarseness       - continue current medications  - schedule EGD with dilation     PLAN  Patient Instructions   Schedule EGD for dysphagia  Continue current medications  Follow up in clinic as needed unless " endoscopy dictates otherwise       The risks and benefits of my recommendations, as well as other treatment options were discussed with the patient today. All questions were answered.      Jerardo Killian MD  Gastroenterology

## 2025-02-04 NOTE — TELEPHONE ENCOUNTER
Notified patient of results. Patient voiced understanding. 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

## 2025-02-04 NOTE — TELEPHONE ENCOUNTER
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

## 2025-02-18 ENCOUNTER — OFFICE VISIT (OUTPATIENT)
Dept: DERMATOLOGY | Facility: CLINIC | Age: 35
End: 2025-02-18
Payer: COMMERCIAL

## 2025-02-18 DIAGNOSIS — Z79.899 HIGH RISK MEDICATION USE: ICD-10-CM

## 2025-02-18 DIAGNOSIS — L73.2 HIDRADENITIS SUPPURATIVA: Primary | ICD-10-CM

## 2025-02-18 DIAGNOSIS — L21.9 SEBORRHEIC DERMATITIS: ICD-10-CM

## 2025-02-18 DIAGNOSIS — L30.9 DERMATITIS, UNSPECIFIED: ICD-10-CM

## 2025-02-18 RX ORDER — TRIAMCINOLONE ACETONIDE 0.25 MG/G
CREAM TOPICAL
Qty: 80 G | Refills: 2 | Status: SHIPPED | OUTPATIENT
Start: 2025-02-18

## 2025-02-18 RX ORDER — TRIAMCINOLONE ACETONIDE 1 MG/G
CREAM TOPICAL
Qty: 80 G | Refills: 2 | Status: SHIPPED | OUTPATIENT
Start: 2025-02-18

## 2025-02-18 RX ORDER — HYDROXYZINE HYDROCHLORIDE 10 MG/1
10 TABLET, FILM COATED ORAL NIGHTLY
COMMUNITY
Start: 2024-12-12

## 2025-02-18 RX ORDER — MELOXICAM 15 MG/1
15 TABLET ORAL
COMMUNITY

## 2025-02-18 RX ORDER — DEXTROAMPHETAMINE SACCHARATE, AMPHETAMINE ASPARTATE MONOHYDRATE, DEXTROAMPHETAMINE SULFATE AND AMPHETAMINE SULFATE 3.75; 3.75; 3.75; 3.75 MG/1; MG/1; MG/1; MG/1
CAPSULE, EXTENDED RELEASE ORAL EVERY MORNING
COMMUNITY
Start: 2025-01-08

## 2025-02-18 RX ORDER — CLOBETASOL PROPIONATE 0.5 MG/ML
SOLUTION TOPICAL
Qty: 50 ML | Refills: 5 | Status: SHIPPED | OUTPATIENT
Start: 2025-02-18

## 2025-02-18 RX ORDER — AMOXICILLIN 500 MG/1
500 CAPSULE ORAL 3 TIMES DAILY
COMMUNITY
Start: 2024-08-29

## 2025-02-18 RX ORDER — ONDANSETRON 4 MG/1
4 TABLET, ORALLY DISINTEGRATING ORAL EVERY 8 HOURS PRN
COMMUNITY
Start: 2024-09-07

## 2025-02-18 RX ORDER — CLINDAMYCIN PHOSPHATE 10 MG/G
GEL TOPICAL
Qty: 60 G | Refills: 11 | Status: SHIPPED | OUTPATIENT
Start: 2025-02-18

## 2025-02-18 RX ORDER — DULOXETIN HYDROCHLORIDE 30 MG/1
30 CAPSULE, DELAYED RELEASE ORAL
COMMUNITY
Start: 2024-12-12

## 2025-02-18 RX ORDER — DICLOFENAC SODIUM 75 MG/1
75 TABLET, DELAYED RELEASE ORAL 2 TIMES DAILY
COMMUNITY

## 2025-02-18 RX ORDER — HYDROCODONE BITARTRATE AND ACETAMINOPHEN 7.5; 325 MG/1; MG/1
1 TABLET ORAL
COMMUNITY
Start: 2024-09-19

## 2025-02-18 RX ORDER — CLINDAMYCIN HYDROCHLORIDE 300 MG/1
300 CAPSULE ORAL 3 TIMES DAILY
COMMUNITY
Start: 2024-09-11

## 2025-02-18 RX ORDER — PREDNISONE 10 MG/1
TABLET ORAL
COMMUNITY
Start: 2024-11-04

## 2025-02-18 RX ORDER — SPIRONOLACTONE 50 MG/1
TABLET, FILM COATED ORAL
Qty: 60 TABLET | Refills: 2 | Status: SHIPPED | OUTPATIENT
Start: 2025-02-18

## 2025-02-18 RX ORDER — CYCLOBENZAPRINE HCL 10 MG
10 TABLET ORAL NIGHTLY
COMMUNITY

## 2025-02-18 RX ORDER — TIZANIDINE 4 MG/1
4 TABLET ORAL 2 TIMES DAILY
COMMUNITY
Start: 2024-12-12

## 2025-02-18 NOTE — PROGRESS NOTES
[facility]   Gemma Thrasher MD    Patient Name: Tiffany Cornejo  Patient YOB: 1990   Date of Service: 25    CC: Hidradenitis Suppurativa     HPI: Tiffany Conrejo is a 34 y.o. female here today for HS, located on the bilateral axilla.  HS has been present for many years.  Previous treatments include antibiotics.  Patient is also concerned today about a rash on the face and body that has been present for 1.5 years.    Past Medical History:   Diagnosis Date    ADHD (attention deficit hyperactivity disorder)      Past Surgical History:   Procedure Laterality Date     SECTION      EXCISION OF HIDRADENITIS      GALLBLADDER SURGERY      HIP SURGERY      KNEE SURGERY       Review of patient's allergies indicates:   Allergen Reactions    Gluten protein      Current Medications[1]    ROS: A focused review of systems was obtained and negative.     Exam: A focused skin exam was performed. All areas examined were normal except as mentioned in the assessment and plan below.  General Appearance of the patient is well developed and well nourished.  Orientation: alert and oriented x 3.  Mood and affect: pleasant.    Assessment:   The primary encounter diagnosis was Hidradenitis suppurativa. Diagnoses of Dermatitis, unspecified, Seborrheic dermatitis, and High risk medication use were also pertinent to this visit.    Plan:   Medications Ordered This Encounter   Medications    clindamycin phosphate 1% 1 % gel     Sig: Apply to AA daily after bathing     Dispense:  60 g     Refill:  11    clobetasoL (TEMOVATE) 0.05 % external solution     Sig: Apply to AA on scalp BID PRN flares tapering with improvement     Dispense:  50 mL     Refill:  5    spironolactone (ALDACTONE) 50 MG tablet     Sig: Take one 50 mg tablet PO daily for three days and then increase to twice a day if tolerated     Dispense:  60 tablet     Refill:  2     .    triamcinolone acetonide 0.025% (KENALOG) 0.025 % cream     Sig: Apply 1g to  AA on face BID PRN flares tapering with improvement     Dispense:  80 g     Refill:  2    triamcinolone acetonide 0.1% (KENALOG) 0.1 % cream     Sig: Apply 2G to affected areas on the body BID, tapering with improvement.     Dispense:  80 g     Refill:  2       Hidradenitis Suppurativa  - comedones, scarring, and dermal cysts in bilateral axilla  Kim Stage: I  Status: Inadequately controlled      Plan: Counseling.  I counseled the patient regarding the following:  Skin care: Cleanse acneiform lesions and sinus tracts with anti-bacterial washes. Oral antibiotics can help reduce  inflammation.  Expectations: Hidradenitis Suppurativa is characterized by acneiform lesions and draining sinus tracts in the  axillary, abdominal or inguinal folds.  Contact office if: Patient develops worsening lesions or fistula formation despite treatment.     Spironolactone Counseling: Patient advised regarding risks of diarrhea, abdominal pain, hyperkalemia, birth defects (for female patients), liver toxicity and renal toxicity. The patient may need blood work to monitor liver and kidney function and potassium levels while on therapy. The patient verbalized understanding of the proper use and possible adverse effects of spironolactone. All of the patient's questions and concerns were addressed.    - Will start spironolactone and clindamycin gel      High Risk Medication Monitoring (Z79.899) : The risks and benefits of the medication were reviewed in full with the patient. Should any side effects occur, the patient will stop the medication and contact me immediately.    - Will check potassium at follow-up      Seborrheic Dermatitis  - Pink/orange scaly plaques located on the scalp, nasolabial folds, and eyebrows    Status: Inadequately controlled     Plan: Counseling.  I counseled the patient regarding the following:  Skin care: Emollients, shampoos with tar, selenium or zinc pyrithione can improve seborrheic dermatitis.  Expectations:  Seborrheic Dermatitis is chronic in nature with periods of remissions and flares. Flares can be  triggered by stress.  Contact office if: Seborrheic dermatitis worsens, or fails to improve despite several months of treatment.    Plan: Prescription     - will start Clobetasol solution      Dermatitis Unspecified  - nummular, oval plaques  DDx: guttate psoriasis vs nummular eczema, less likely PLeVA    Plan: Counseling  I counseled the patient regarding the following:  Skin care: Patient instructed to use gentle skin care including dove unscented soap, CeraVe moisturizing cream, and fragrance free laundry detergent.  Expectations: The patient understands that there is not a definitive diagnosis at this time. Further testing or empiric therapy may be necessary to diagnose and improve the condition.  Contact office if: The patient develops a fever, or rash dramatically worsens despite treatment.    - Will start TAC 0.1%  - Instructed to call the clinic if rash flares to consider biopsy      Follow up in about 3 months (around 5/18/2025) for FU.    Gemma Thrasher MD           [1]   Current Outpatient Medications:     amoxicillin (AMOXIL) 500 MG capsule, Take 500 mg by mouth 3 (three) times daily., Disp: , Rfl:     clindamycin (CLEOCIN) 300 MG capsule, Take 300 mg by mouth 3 (three) times daily., Disp: , Rfl:     dextroamphetamine-amphetamine (ADDERALL XR) 15 MG 24 hr capsule, Take by mouth every morning., Disp: , Rfl:     DULoxetine (CYMBALTA) 30 MG capsule, Take 30 mg by mouth., Disp: , Rfl:     HYDROcodone-acetaminophen (NORCO) 7.5-325 mg per tablet, Take 1 tablet by mouth., Disp: , Rfl:     hydrOXYzine HCL (ATARAX) 10 MG Tab, Take 10 mg by mouth every evening., Disp: , Rfl:     ondansetron (ZOFRAN-ODT) 4 MG TbDL, Take 4 mg by mouth every 8 (eight) hours as needed., Disp: , Rfl:     predniSONE (DELTASONE) 10 MG tablet, Take by mouth., Disp: , Rfl:     tiZANidine (ZANAFLEX) 4 MG tablet, Take 4 mg by mouth 2 (two) times  daily., Disp: , Rfl:     albuterol (PROAIR HFA) 90 mcg/actuation inhaler, Inhale 2 puffs into the lungs every 6 (six) hours as needed for Wheezing. Rescue, Disp: 18 g, Rfl: 3    baclofen (LIORESAL) 10 MG tablet, Take 1 tablet (10 mg total) by mouth every evening., Disp: 30 tablet, Rfl: 2    clindamycin phosphate 1% 1 % gel, Apply to AA daily after bathing, Disp: 60 g, Rfl: 11    clobetasoL (TEMOVATE) 0.05 % external solution, Apply to AA on scalp BID PRN flares tapering with improvement, Disp: 50 mL, Rfl: 5    cyclobenzaprine (FLEXERIL) 10 MG tablet, Take 10 mg by mouth every evening., Disp: , Rfl:     dextroamphetamine-amphetamine (ADDERALL XR) 5 MG 24 hr capsule, Take 5 mg by mouth once daily., Disp: , Rfl:     diclofenac (VOLTAREN) 75 MG EC tablet, Take 75 mg by mouth 2 (two) times daily., Disp: , Rfl:     DULoxetine (CYMBALTA) 20 MG capsule, Take 20 mg by mouth once daily., Disp: , Rfl:     meloxicam (MOBIC) 15 MG tablet, Take 15 mg by mouth., Disp: , Rfl:     meloxicam (MOBIC) 7.5 MG tablet, Take 1 tablet (7.5 mg total) by mouth once daily., Disp: 30 tablet, Rfl: 2    naproxen (NAPROSYN) 500 MG tablet, TAKE 1 TABLET BY MOUTH TWICE A DAY WITH FOOD ...STOP TAKING ALL OTHER NSAIDS WHILE TAKING THIS MEDICATION, Disp: 60 tablet, Rfl: 2    omeprazole (PRILOSEC) 20 MG capsule, Take 1 capsule (20 mg total) by mouth once daily., Disp: 30 capsule, Rfl: 5    spironolactone (ALDACTONE) 50 MG tablet, Take one 50 mg tablet PO daily for three days and then increase to twice a day if tolerated, Disp: 60 tablet, Rfl: 2    triamcinolone acetonide 0.025% (KENALOG) 0.025 % cream, Apply 1g to AA on face BID PRN flares tapering with improvement, Disp: 80 g, Rfl: 2    triamcinolone acetonide 0.1% (KENALOG) 0.1 % cream, Apply 2G to affected areas on the body BID, tapering with improvement., Disp: 80 g, Rfl: 2

## 2025-08-13 ENCOUNTER — OFFICE VISIT (OUTPATIENT)
Dept: FAMILY MEDICINE | Facility: CLINIC | Age: 35
End: 2025-08-13
Payer: COMMERCIAL

## 2025-08-13 VITALS
OXYGEN SATURATION: 99 % | RESPIRATION RATE: 18 BRPM | DIASTOLIC BLOOD PRESSURE: 84 MMHG | HEIGHT: 66 IN | HEART RATE: 100 BPM | TEMPERATURE: 98 F | SYSTOLIC BLOOD PRESSURE: 118 MMHG | BODY MASS INDEX: 34.23 KG/M2 | WEIGHT: 213 LBS

## 2025-08-13 DIAGNOSIS — R05.3 CHRONIC COUGH: ICD-10-CM

## 2025-08-13 DIAGNOSIS — M51.9 LUMBAR DISC DISEASE: ICD-10-CM

## 2025-08-13 DIAGNOSIS — R13.10 DYSPHAGIA, UNSPECIFIED TYPE: ICD-10-CM

## 2025-08-13 DIAGNOSIS — R49.0 HOARSENESS: ICD-10-CM

## 2025-08-13 DIAGNOSIS — G43.001 MIGRAINE WITHOUT AURA AND WITH STATUS MIGRAINOSUS, NOT INTRACTABLE: Primary | ICD-10-CM

## 2025-08-13 DIAGNOSIS — M54.2 NECK PAIN: ICD-10-CM

## 2025-08-13 LAB
ALBUMIN SERPL BCP-MCNC: 4.1 G/DL (ref 3.5–5)
ALBUMIN/GLOB SERPL: 1.2 {RATIO}
ALP SERPL-CCNC: 55 U/L (ref 40–150)
ALT SERPL W P-5'-P-CCNC: 12 U/L
ANION GAP SERPL CALCULATED.3IONS-SCNC: 13 MMOL/L (ref 7–16)
AST SERPL W P-5'-P-CCNC: 13 U/L (ref 11–45)
BASOPHILS # BLD AUTO: 0.06 K/UL (ref 0–0.2)
BASOPHILS NFR BLD AUTO: 0.6 % (ref 0–1)
BILIRUB SERPL-MCNC: 0.5 MG/DL
BUN SERPL-MCNC: 12 MG/DL (ref 7–19)
BUN/CREAT SERPL: 12 (ref 6–20)
CALCIUM SERPL-MCNC: 9.2 MG/DL (ref 8.4–10.2)
CHLORIDE SERPL-SCNC: 107 MMOL/L (ref 98–107)
CO2 SERPL-SCNC: 25 MMOL/L (ref 22–29)
CREAT SERPL-MCNC: 1.04 MG/DL (ref 0.55–1.02)
DIFFERENTIAL METHOD BLD: ABNORMAL
EGFR (NO RACE VARIABLE) (RUSH/TITUS): 72 ML/MIN/1.73M2
EOSINOPHIL # BLD AUTO: 0.18 K/UL (ref 0–0.5)
EOSINOPHIL NFR BLD AUTO: 1.7 % (ref 1–4)
ERYTHROCYTE [DISTWIDTH] IN BLOOD BY AUTOMATED COUNT: 13.1 % (ref 11.5–14.5)
GLOBULIN SER-MCNC: 3.5 G/DL (ref 2–4)
GLUCOSE SERPL-MCNC: 62 MG/DL (ref 74–100)
HCT VFR BLD AUTO: 42.1 % (ref 38–47)
HGB BLD-MCNC: 12.8 G/DL (ref 12–16)
IMM GRANULOCYTES # BLD AUTO: 0.03 K/UL (ref 0–0.04)
IMM GRANULOCYTES NFR BLD: 0.3 % (ref 0–0.4)
LYMPHOCYTES # BLD AUTO: 3 K/UL (ref 1–4.8)
LYMPHOCYTES NFR BLD AUTO: 28.5 % (ref 27–41)
MCH RBC QN AUTO: 27.6 PG (ref 27–31)
MCHC RBC AUTO-ENTMCNC: 30.4 G/DL (ref 32–36)
MCV RBC AUTO: 90.9 FL (ref 80–96)
MONOCYTES # BLD AUTO: 0.99 K/UL (ref 0–0.8)
MONOCYTES NFR BLD AUTO: 9.4 % (ref 2–6)
MPC BLD CALC-MCNC: 10.8 FL (ref 9.4–12.4)
NEUTROPHILS # BLD AUTO: 6.28 K/UL (ref 1.8–7.7)
NEUTROPHILS NFR BLD AUTO: 59.5 % (ref 53–65)
NRBC # BLD AUTO: 0 X10E3/UL
NRBC, AUTO (.00): 0 %
PLATELET # BLD AUTO: 299 K/UL (ref 150–400)
POTASSIUM SERPL-SCNC: 4.1 MMOL/L (ref 3.5–5.1)
PROT SERPL-MCNC: 7.6 G/DL (ref 6.4–8.3)
RBC # BLD AUTO: 4.63 M/UL (ref 4.2–5.4)
SODIUM SERPL-SCNC: 141 MMOL/L (ref 136–145)
WBC # BLD AUTO: 10.54 K/UL (ref 4.5–11)

## 2025-08-13 PROCEDURE — 3008F BODY MASS INDEX DOCD: CPT | Mod: ,,, | Performed by: FAMILY MEDICINE

## 2025-08-13 PROCEDURE — 85025 COMPLETE CBC W/AUTO DIFF WBC: CPT | Mod: ,,, | Performed by: CLINICAL MEDICAL LABORATORY

## 2025-08-13 PROCEDURE — 1160F RVW MEDS BY RX/DR IN RCRD: CPT | Mod: ,,, | Performed by: FAMILY MEDICINE

## 2025-08-13 PROCEDURE — 3074F SYST BP LT 130 MM HG: CPT | Mod: ,,, | Performed by: FAMILY MEDICINE

## 2025-08-13 PROCEDURE — 3079F DIAST BP 80-89 MM HG: CPT | Mod: ,,, | Performed by: FAMILY MEDICINE

## 2025-08-13 PROCEDURE — 1159F MED LIST DOCD IN RCRD: CPT | Mod: ,,, | Performed by: FAMILY MEDICINE

## 2025-08-13 PROCEDURE — 80053 COMPREHEN METABOLIC PANEL: CPT | Mod: ,,, | Performed by: CLINICAL MEDICAL LABORATORY

## 2025-08-13 PROCEDURE — 99214 OFFICE O/P EST MOD 30 MIN: CPT | Mod: ,,, | Performed by: FAMILY MEDICINE

## 2025-08-13 RX ORDER — ONDANSETRON 4 MG/1
4 TABLET, ORALLY DISINTEGRATING ORAL EVERY 8 HOURS PRN
Qty: 30 TABLET | Refills: 2 | Status: SHIPPED | OUTPATIENT
Start: 2025-08-13

## 2025-08-13 RX ORDER — ALBUTEROL SULFATE 90 UG/1
2 INHALANT RESPIRATORY (INHALATION) EVERY 6 HOURS PRN
Qty: 18 G | Refills: 3 | Status: SHIPPED | OUTPATIENT
Start: 2025-08-13 | End: 2026-08-13

## 2025-08-13 RX ORDER — OMEPRAZOLE 20 MG/1
20 CAPSULE, DELAYED RELEASE ORAL DAILY
Qty: 30 CAPSULE | Refills: 5 | Status: SHIPPED | OUTPATIENT
Start: 2025-08-13 | End: 2025-08-13

## 2025-08-13 RX ORDER — BACLOFEN 10 MG/1
10 TABLET ORAL NIGHTLY
Qty: 30 TABLET | Refills: 5 | Status: SHIPPED | OUTPATIENT
Start: 2025-08-13 | End: 2026-02-09

## 2025-08-13 RX ORDER — PHENOL/SODIUM PHENOLATE
1 AEROSOL, SPRAY (ML) MUCOUS MEMBRANE 2 TIMES DAILY
Qty: 60 EACH | Refills: 5 | Status: SHIPPED | OUTPATIENT
Start: 2025-08-13 | End: 2026-08-13

## 2025-08-14 ENCOUNTER — TELEPHONE (OUTPATIENT)
Dept: FAMILY MEDICINE | Facility: CLINIC | Age: 35
End: 2025-08-14
Payer: COMMERCIAL